# Patient Record
Sex: MALE | Race: WHITE | NOT HISPANIC OR LATINO | Employment: FULL TIME | URBAN - METROPOLITAN AREA
[De-identification: names, ages, dates, MRNs, and addresses within clinical notes are randomized per-mention and may not be internally consistent; named-entity substitution may affect disease eponyms.]

---

## 2017-04-03 ENCOUNTER — APPOINTMENT (OUTPATIENT)
Dept: PHYSICAL THERAPY | Facility: CLINIC | Age: 52
End: 2017-04-03
Payer: COMMERCIAL

## 2017-04-03 PROCEDURE — 97162 PT EVAL MOD COMPLEX 30 MIN: CPT

## 2017-04-03 PROCEDURE — 97535 SELF CARE MNGMENT TRAINING: CPT

## 2017-04-05 ENCOUNTER — APPOINTMENT (OUTPATIENT)
Dept: PHYSICAL THERAPY | Facility: CLINIC | Age: 52
End: 2017-04-05
Payer: COMMERCIAL

## 2017-04-05 PROCEDURE — 97140 MANUAL THERAPY 1/> REGIONS: CPT

## 2017-04-05 PROCEDURE — 97110 THERAPEUTIC EXERCISES: CPT

## 2017-04-10 ENCOUNTER — APPOINTMENT (OUTPATIENT)
Dept: PHYSICAL THERAPY | Facility: CLINIC | Age: 52
End: 2017-04-10
Payer: COMMERCIAL

## 2017-04-10 PROCEDURE — 97110 THERAPEUTIC EXERCISES: CPT

## 2017-04-10 PROCEDURE — 97140 MANUAL THERAPY 1/> REGIONS: CPT

## 2017-04-12 ENCOUNTER — APPOINTMENT (OUTPATIENT)
Dept: PHYSICAL THERAPY | Facility: CLINIC | Age: 52
End: 2017-04-12
Payer: COMMERCIAL

## 2017-04-12 PROCEDURE — 97140 MANUAL THERAPY 1/> REGIONS: CPT

## 2017-04-12 PROCEDURE — 97110 THERAPEUTIC EXERCISES: CPT

## 2017-04-19 ENCOUNTER — APPOINTMENT (OUTPATIENT)
Dept: PHYSICAL THERAPY | Facility: CLINIC | Age: 52
End: 2017-04-19
Payer: COMMERCIAL

## 2017-04-19 PROCEDURE — 97110 THERAPEUTIC EXERCISES: CPT

## 2017-04-19 PROCEDURE — 97140 MANUAL THERAPY 1/> REGIONS: CPT

## 2017-04-24 ENCOUNTER — APPOINTMENT (OUTPATIENT)
Dept: PHYSICAL THERAPY | Facility: CLINIC | Age: 52
End: 2017-04-24
Payer: COMMERCIAL

## 2017-04-24 PROCEDURE — 97110 THERAPEUTIC EXERCISES: CPT

## 2017-04-24 PROCEDURE — 97140 MANUAL THERAPY 1/> REGIONS: CPT

## 2017-04-26 ENCOUNTER — APPOINTMENT (OUTPATIENT)
Dept: PHYSICAL THERAPY | Facility: CLINIC | Age: 52
End: 2017-04-26
Payer: COMMERCIAL

## 2017-04-26 PROCEDURE — 97140 MANUAL THERAPY 1/> REGIONS: CPT

## 2017-04-26 PROCEDURE — 97110 THERAPEUTIC EXERCISES: CPT

## 2017-04-27 ENCOUNTER — APPOINTMENT (OUTPATIENT)
Dept: PHYSICAL THERAPY | Facility: CLINIC | Age: 52
End: 2017-04-27
Payer: COMMERCIAL

## 2017-05-04 ENCOUNTER — APPOINTMENT (OUTPATIENT)
Dept: PHYSICAL THERAPY | Facility: CLINIC | Age: 52
End: 2017-05-04
Payer: COMMERCIAL

## 2017-05-18 ENCOUNTER — APPOINTMENT (OUTPATIENT)
Dept: PHYSICAL THERAPY | Facility: CLINIC | Age: 52
End: 2017-05-18
Payer: COMMERCIAL

## 2017-05-18 PROCEDURE — 97164 PT RE-EVAL EST PLAN CARE: CPT

## 2017-05-18 PROCEDURE — 97140 MANUAL THERAPY 1/> REGIONS: CPT

## 2017-05-18 PROCEDURE — 97110 THERAPEUTIC EXERCISES: CPT

## 2017-05-23 ENCOUNTER — APPOINTMENT (OUTPATIENT)
Dept: PHYSICAL THERAPY | Facility: CLINIC | Age: 52
End: 2017-05-23
Payer: COMMERCIAL

## 2017-05-23 PROCEDURE — 97140 MANUAL THERAPY 1/> REGIONS: CPT

## 2017-05-23 PROCEDURE — 97110 THERAPEUTIC EXERCISES: CPT

## 2017-05-24 ENCOUNTER — APPOINTMENT (OUTPATIENT)
Dept: PHYSICAL THERAPY | Facility: CLINIC | Age: 52
End: 2017-05-24
Payer: COMMERCIAL

## 2017-05-24 PROCEDURE — 97110 THERAPEUTIC EXERCISES: CPT

## 2017-05-24 PROCEDURE — 97140 MANUAL THERAPY 1/> REGIONS: CPT

## 2017-05-30 ENCOUNTER — APPOINTMENT (OUTPATIENT)
Dept: PHYSICAL THERAPY | Facility: CLINIC | Age: 52
End: 2017-05-30
Payer: COMMERCIAL

## 2017-05-30 PROCEDURE — 97110 THERAPEUTIC EXERCISES: CPT

## 2017-05-30 PROCEDURE — 97140 MANUAL THERAPY 1/> REGIONS: CPT

## 2017-06-02 ENCOUNTER — APPOINTMENT (OUTPATIENT)
Dept: PHYSICAL THERAPY | Facility: CLINIC | Age: 52
End: 2017-06-02
Payer: COMMERCIAL

## 2017-06-02 PROCEDURE — 97140 MANUAL THERAPY 1/> REGIONS: CPT

## 2017-06-02 PROCEDURE — 97110 THERAPEUTIC EXERCISES: CPT

## 2017-06-07 ENCOUNTER — APPOINTMENT (OUTPATIENT)
Dept: PHYSICAL THERAPY | Facility: CLINIC | Age: 52
End: 2017-06-07
Payer: COMMERCIAL

## 2017-06-07 PROCEDURE — 97110 THERAPEUTIC EXERCISES: CPT

## 2017-06-07 PROCEDURE — 97140 MANUAL THERAPY 1/> REGIONS: CPT

## 2017-06-09 ENCOUNTER — APPOINTMENT (OUTPATIENT)
Dept: PHYSICAL THERAPY | Facility: CLINIC | Age: 52
End: 2017-06-09
Payer: COMMERCIAL

## 2017-06-09 PROCEDURE — 97140 MANUAL THERAPY 1/> REGIONS: CPT

## 2017-06-09 PROCEDURE — 97110 THERAPEUTIC EXERCISES: CPT

## 2017-06-12 ENCOUNTER — APPOINTMENT (OUTPATIENT)
Dept: PHYSICAL THERAPY | Facility: CLINIC | Age: 52
End: 2017-06-12
Payer: COMMERCIAL

## 2017-06-12 PROCEDURE — 97140 MANUAL THERAPY 1/> REGIONS: CPT

## 2017-06-12 PROCEDURE — 97110 THERAPEUTIC EXERCISES: CPT

## 2017-06-15 ENCOUNTER — APPOINTMENT (OUTPATIENT)
Dept: PHYSICAL THERAPY | Facility: CLINIC | Age: 52
End: 2017-06-15
Payer: COMMERCIAL

## 2017-06-15 PROCEDURE — 97110 THERAPEUTIC EXERCISES: CPT

## 2017-06-15 PROCEDURE — 97140 MANUAL THERAPY 1/> REGIONS: CPT

## 2017-07-20 ENCOUNTER — APPOINTMENT (OUTPATIENT)
Dept: LAB | Facility: HOSPITAL | Age: 52
End: 2017-07-20
Payer: COMMERCIAL

## 2017-07-20 ENCOUNTER — TRANSCRIBE ORDERS (OUTPATIENT)
Dept: ADMINISTRATIVE | Facility: HOSPITAL | Age: 52
End: 2017-07-20

## 2017-07-20 DIAGNOSIS — Z00.00 ENCOUNTER FOR GENERAL HEALTH EXAMINATION: Primary | ICD-10-CM

## 2017-07-20 DIAGNOSIS — Z00.00 ENCOUNTER FOR GENERAL HEALTH EXAMINATION: ICD-10-CM

## 2017-07-20 LAB
CHOLEST SERPL-MCNC: 189 MG/DL (ref 50–200)
EST. AVERAGE GLUCOSE BLD GHB EST-MCNC: 111 MG/DL
HBA1C MFR BLD: 5.5 % (ref 4.2–6.3)
HDLC SERPL-MCNC: 37 MG/DL (ref 40–60)
LDLC SERPL CALC-MCNC: 115 MG/DL (ref 0–100)
TRIGL SERPL-MCNC: 187 MG/DL

## 2017-07-20 PROCEDURE — 83036 HEMOGLOBIN GLYCOSYLATED A1C: CPT

## 2017-07-20 PROCEDURE — 80061 LIPID PANEL: CPT

## 2017-07-20 PROCEDURE — 36415 COLL VENOUS BLD VENIPUNCTURE: CPT

## 2017-07-25 ENCOUNTER — APPOINTMENT (OUTPATIENT)
Dept: RADIOLOGY | Facility: CLINIC | Age: 52
End: 2017-07-25
Payer: COMMERCIAL

## 2017-07-25 ENCOUNTER — ALLSCRIPTS OFFICE VISIT (OUTPATIENT)
Dept: OTHER | Facility: OTHER | Age: 52
End: 2017-07-25

## 2017-07-25 DIAGNOSIS — M25.569 PAIN IN KNEE: ICD-10-CM

## 2017-07-25 PROCEDURE — 73562 X-RAY EXAM OF KNEE 3: CPT

## 2017-07-25 PROCEDURE — 73564 X-RAY EXAM KNEE 4 OR MORE: CPT

## 2018-01-14 VITALS
DIASTOLIC BLOOD PRESSURE: 87 MMHG | HEIGHT: 70 IN | SYSTOLIC BLOOD PRESSURE: 130 MMHG | HEART RATE: 67 BPM | WEIGHT: 250 LBS | BODY MASS INDEX: 35.79 KG/M2

## 2018-07-31 ENCOUNTER — TRANSCRIBE ORDERS (OUTPATIENT)
Dept: LAB | Facility: CLINIC | Age: 53
End: 2018-07-31

## 2018-07-31 ENCOUNTER — APPOINTMENT (OUTPATIENT)
Dept: LAB | Facility: CLINIC | Age: 53
End: 2018-07-31

## 2018-07-31 DIAGNOSIS — Z00.8 HEALTH EXAMINATION IN POPULATION SURVEY: ICD-10-CM

## 2018-07-31 DIAGNOSIS — Z00.8 HEALTH EXAMINATION IN POPULATION SURVEY: Primary | ICD-10-CM

## 2018-07-31 LAB
CHOLEST SERPL-MCNC: 195 MG/DL (ref 50–200)
EST. AVERAGE GLUCOSE BLD GHB EST-MCNC: 111 MG/DL
HBA1C MFR BLD: 5.5 % (ref 4.2–6.3)
HDLC SERPL-MCNC: 33 MG/DL (ref 40–60)
LDLC SERPL CALC-MCNC: 101 MG/DL (ref 0–100)
NONHDLC SERPL-MCNC: 162 MG/DL
TRIGL SERPL-MCNC: 304 MG/DL

## 2018-07-31 PROCEDURE — 36415 COLL VENOUS BLD VENIPUNCTURE: CPT

## 2018-07-31 PROCEDURE — 80061 LIPID PANEL: CPT

## 2018-07-31 PROCEDURE — 83036 HEMOGLOBIN GLYCOSYLATED A1C: CPT

## 2018-09-10 ENCOUNTER — TELEPHONE (OUTPATIENT)
Dept: SLEEP CENTER | Facility: CLINIC | Age: 53
End: 2018-09-10

## 2018-09-10 NOTE — TELEPHONE ENCOUNTER
Called pt and left message to r/c to schedule Sleep Consult  Referral received from Dr Jennifer Hutton

## 2019-08-29 ENCOUNTER — OFFICE VISIT (OUTPATIENT)
Dept: URGENT CARE | Facility: CLINIC | Age: 54
End: 2019-08-29
Payer: COMMERCIAL

## 2019-08-29 VITALS
DIASTOLIC BLOOD PRESSURE: 83 MMHG | OXYGEN SATURATION: 100 % | TEMPERATURE: 98.1 F | RESPIRATION RATE: 18 BRPM | SYSTOLIC BLOOD PRESSURE: 154 MMHG | HEART RATE: 60 BPM

## 2019-08-29 DIAGNOSIS — S05.02XA ABRASION OF LEFT CORNEA, INITIAL ENCOUNTER: Primary | ICD-10-CM

## 2019-08-29 DIAGNOSIS — H57.89 REDNESS OF EYE, RIGHT: ICD-10-CM

## 2019-08-29 PROCEDURE — 99203 OFFICE O/P NEW LOW 30 MIN: CPT | Performed by: NURSE PRACTITIONER

## 2019-08-29 RX ORDER — TETRACAINE HYDROCHLORIDE 5 MG/ML
1 SOLUTION OPHTHALMIC ONCE
Status: COMPLETED | OUTPATIENT
Start: 2019-08-29 | End: 2019-08-29

## 2019-08-29 RX ORDER — METOPROLOL SUCCINATE 50 MG/1
50 TABLET, EXTENDED RELEASE ORAL DAILY
COMMUNITY
Start: 2019-03-05

## 2019-08-29 RX ORDER — ERYTHROMYCIN 5 MG/G
0.5 OINTMENT OPHTHALMIC EVERY 6 HOURS SCHEDULED
Qty: 3.5 G | Refills: 0 | Status: SHIPPED | OUTPATIENT
Start: 2019-08-29 | End: 2019-09-03

## 2019-08-29 RX ADMIN — TETRACAINE HYDROCHLORIDE 1 DROP: 5 SOLUTION OPHTHALMIC at 09:26

## 2019-08-29 NOTE — PROGRESS NOTES
[facility]        NAME: Kojo Martinze is a 47 y o  male  : 1965    MRN: [id number]  DATE: 2019  TIME: 10:25 AM    Assessment and Plan   Abrasion of left cornea, initial encounter [S05  02XA]  1  Abrasion of left cornea, initial encounter  erythromycin (ILOTYCIN) ophthalmic ointment   2  Redness of eye, right  tetracaine 0 5 % ophthalmic solution 1 drop    fluorescein sodium sterile 1 mg ophthalmic strip 1 strip         Patient Instructions     Use ointment as directed  Recommend follow-up with eye doctor  If he develops any increased pain, redness, drainage, swelling, blurred vision or any visual changes return or proceed ER  Follow up with PCP in 3-5 days  Proceed to  ER if symptoms worsen  Chief Complaint     Chief Complaint   Patient presents with    Eye Pain     Pt c/o left eye pain since yesterday  History of Present Illness       Patient is a 55-year-old male presents with a 2 day history of left eye pain, redness, drainage, foreign body sensation  Patient states that he flushed out his eye and  used over-the-counter eye drops with no relief  Denies any blurred vision, visual changes, headache, or dizziness  Complaining of photophobia  Denies any contact lens use  Review of Systems   Review of Systems   Constitutional: Negative for chills, diaphoresis, fatigue and fever  HENT: Negative  Eyes: Positive for photophobia, pain, discharge and redness  Negative for itching and visual disturbance  Respiratory: Negative for cough, chest tightness and shortness of breath  Cardiovascular: Negative for chest pain and palpitations  Musculoskeletal: Negative for arthralgias, back pain, joint swelling, myalgias, neck pain and neck stiffness  Skin: Negative for rash  Neurological: Negative for dizziness, facial asymmetry, weakness, light-headedness, numbness and headaches           Current Medications       Current Outpatient Medications:    metoprolol succinate (TOPROL-XL) 50 mg 24 hr tablet, Take 50 mg by mouth daily, Disp: , Rfl:     erythromycin (ILOTYCIN) ophthalmic ointment, Administer 0 5 inches into the left eye every 6 (six) hours for 5 days, Disp: 3 5 g, Rfl: 0  No current facility-administered medications for this visit  Current Allergies     Allergies as of 08/29/2019    (No Known Allergies)            The following portions of the patient's history were reviewed and updated as appropriate: allergies, current medications, past family history, past medical history, past social history, past surgical history and problem list      No past medical history on file  No past surgical history on file  No family history on file  Medications have been verified  Objective   /83   Pulse 60   Temp 98 1 °F (36 7 °C)   Resp 18   SpO2 100%        Physical Exam     Physical Exam   Constitutional: He is oriented to person, place, and time  He appears well-developed and well-nourished  No distress  HENT:   Head: Normocephalic and atraumatic  Eyes: Pupils are equal, round, and reactive to light  EOM and lids are normal  Lids are everted and swept, no foreign bodies found  Left eye exhibits no chemosis, no discharge, no exudate and no hordeolum  No foreign body present in the left eye  Left conjunctiva is injected  Left conjunctiva has no hemorrhage  No scleral icterus  Slit lamp exam:       The left eye shows corneal abrasion and fluorescein uptake  Cardiovascular: Normal rate, regular rhythm, S1 normal, S2 normal, normal heart sounds, intact distal pulses and normal pulses  Pulmonary/Chest: Effort normal and breath sounds normal    Neurological: He is alert and oriented to person, place, and time  GCS eye subscore is 4  GCS verbal subscore is 5  GCS motor subscore is 6  Skin: Skin is warm and dry  Capillary refill takes less than 2 seconds  He is not diaphoretic

## 2019-08-29 NOTE — PATIENT INSTRUCTIONS
Use ointment as directed  Recommend follow-up with eye doctor  If he develops any increased pain, redness, drainage, swelling, blurred vision or any visual changes return or proceed ER  Corneal Abrasion   WHAT YOU NEED TO KNOW:   A corneal abrasion is a scratch on the cornea of your eye  The cornea is the clear layer that covers the front of your eye  A small scratch may heal in 1 to 2 days  Deeper or larger scratches may take longer to heal         DISCHARGE INSTRUCTIONS:   Contact your healthcare provider if:   · Your eye pain or vision gets worse  · You have yellow or green drainage from your eye  · You have questions or concerns about your condition or care  Medicines:   · Medicines  may be given in the form of eyedrops or ointment to help prevent an eye infection  You may also be given eye drops to decrease pain  Ask how to take this medicine safely  · Take your medicine as directed  Contact your healthcare provider if you think your medicine is not helping or if you have side effects  Tell him or her if you are allergic to any medicine  Keep a list of the medicines, vitamins, and herbs you take  Include the amounts, and when and why you take them  Bring the list or the pill bottles to follow-up visits  Carry your medicine list with you in case of an emergency  Follow up with your healthcare provider as directed:  Write down your questions so you remember to ask them during your visits  Self-care:   · Do not touch or rub your eye  · Ask your healthcare provider when you can start your normal activities  · Ask your healthcare provider when you can wear your contact lenses  · Wear sunglasses in bright light until your eyes feel better  Help prevent corneal abrasions:   · Remove your contact lenses if your eyes feel dry or irritated  · Wash your hands if you need to touch your eyes or your face  · Trim your child's fingernails so he cannot scratch his eye       · Wear protective eyewear when you work with chemicals, wood, dust, or metal      · Wear protective eyewear when you play sports  · Do not wear your contacts for longer than you should  · Do not wear colored lenses or lenses with shapes on them  These lenses may cause eye damage and vision loss  · Do not wear glitter makeup  Glitter can easily get into your eyes and under contact lenses  · Do not sleep with your contacts in your eyes  © 2017 [facility] Information is for End User's use only and may not be sold, redistributed or otherwise used for commercial purposes  All illustrations and images included in CareNotes® are the copyrighted property of [employer]  or [employer]  The above information is an  only  It is not intended as medical advice for individual conditions or treatments  Talk to your doctor, nurse or pharmacist before following any medical regimen to see if it is safe and effective for you

## 2019-08-30 ENCOUNTER — RX ONLY (RX ONLY)
Age: 54
End: 2019-08-30

## 2019-08-30 ENCOUNTER — DOCTOR'S OFFICE (OUTPATIENT)
Dept: URBAN - METROPOLITAN AREA CLINIC 136 | Facility: CLINIC | Age: 54
Setting detail: OPHTHALMOLOGY
End: 2019-08-30
Payer: COMMERCIAL

## 2019-08-30 DIAGNOSIS — H10.13: ICD-10-CM

## 2019-08-30 DIAGNOSIS — H01.001: ICD-10-CM

## 2019-08-30 DIAGNOSIS — S05.02XA: ICD-10-CM

## 2019-08-30 DIAGNOSIS — H25.13: ICD-10-CM

## 2019-08-30 PROCEDURE — 99203 OFFICE O/P NEW LOW 30 MIN: CPT | Performed by: OPHTHALMOLOGY

## 2019-08-30 ASSESSMENT — REFRACTION_CURRENTRX
OS_OVR_VA: 20/
OD_OVR_VA: 20/
OS_OVR_VA: 20/
OS_OVR_VA: 20/
OD_OVR_VA: 20/
OD_OVR_VA: 20/

## 2019-08-30 ASSESSMENT — REFRACTION_MANIFEST
OU_VA: 20/
OS_VA1: 20/
OD_VA2: 20/
OU_VA: 20/
OS_VA3: 20/
OD_VA3: 20/
OD_VA1: 20/
OS_VA1: 20/
OD_VA1: 20/
OS_VA2: 20/
OD_VA2: 20/
OD_VA3: 20/
OS_VA3: 20/
OS_VA2: 20/

## 2019-08-30 ASSESSMENT — LID POSITION - COMMENTS
OD_COMMENTS: FLOPPY LIDS
OS_COMMENTS: FLOPPY LIDS

## 2019-08-30 ASSESSMENT — LID EXAM ASSESSMENTS
OD_BLEPHARITIS: 1+
OS_EDEMA: LUL T 1+
OS_BLEPHARITIS: 1+

## 2019-08-30 ASSESSMENT — CORNEAL TRAUMA - ABRASION: OS_ABRASION: PRESENT

## 2019-08-30 ASSESSMENT — VISUAL ACUITY
OD_BCVA: 20/25+2
OS_BCVA: 20/20-2

## 2019-09-03 ENCOUNTER — RX ONLY (RX ONLY)
Age: 54
End: 2019-09-03

## 2019-09-03 ENCOUNTER — DOCTOR'S OFFICE (OUTPATIENT)
Dept: URBAN - METROPOLITAN AREA CLINIC 136 | Facility: CLINIC | Age: 54
Setting detail: OPHTHALMOLOGY
End: 2019-09-03
Payer: COMMERCIAL

## 2019-09-03 DIAGNOSIS — H02.89: ICD-10-CM

## 2019-09-03 DIAGNOSIS — S05.02XA: ICD-10-CM

## 2019-09-03 DIAGNOSIS — G51.0: ICD-10-CM

## 2019-09-03 PROBLEM — H10.13 ALLERGIC CONJUNCTIVITIS; BOTH EYES: Status: ACTIVE | Noted: 2019-08-30

## 2019-09-03 PROBLEM — H01.001 BLEPHARITIS; RIGHT UPPER LID, LEFT UPPER LID: Status: ACTIVE | Noted: 2019-08-30

## 2019-09-03 PROBLEM — H01.004 BLEPHARITIS; RIGHT UPPER LID, LEFT UPPER LID: Status: ACTIVE | Noted: 2019-08-30

## 2019-09-03 PROBLEM — H25.11 CATARACT NUCLEAR SCLEROSIS AGE RELATED; RIGHT EYE, LEFT EYE: Status: ACTIVE | Noted: 2019-08-30

## 2019-09-03 PROBLEM — H25.12 CATARACT NUCLEAR SCLEROSIS AGE RELATED; RIGHT EYE, LEFT EYE: Status: ACTIVE | Noted: 2019-08-30

## 2019-09-03 PROCEDURE — 92012 INTRM OPH EXAM EST PATIENT: CPT | Performed by: OPHTHALMOLOGY

## 2019-09-03 ASSESSMENT — LID EXAM ASSESSMENTS
OS_BLEPHARITIS: 1+
OD_BLEPHARITIS: 1+
OS_EDEMA: LUL T 1+

## 2019-09-03 ASSESSMENT — REFRACTION_MANIFEST
OD_VA3: 20/
OS_VA2: 20/
OU_VA: 20/
OS_VA1: 20/
OU_VA: 20/
OD_VA1: 20/
OS_VA3: 20/
OS_VA3: 20/
OD_VA2: 20/
OD_VA3: 20/
OS_VA1: 20/
OD_VA2: 20/
OD_VA1: 20/
OS_VA2: 20/

## 2019-09-03 ASSESSMENT — REFRACTION_CURRENTRX
OS_OVR_VA: 20/
OS_OVR_VA: 20/
OD_OVR_VA: 20/
OD_OVR_VA: 20/
OS_OVR_VA: 20/
OD_OVR_VA: 20/

## 2019-09-03 ASSESSMENT — KERATOMETRY
OS_K1POWER_DIOPTERS: 43.00
OD_AXISANGLE_DEGREES: 144
OS_K2POWER_DIOPTERS: 43.75
OD_K1POWER_DIOPTERS: 43.00
OD_K2POWER_DIOPTERS: 43.00
OS_AXISANGLE_DEGREES: 160

## 2019-09-03 ASSESSMENT — REFRACTION_AUTOREFRACTION
OS_CYLINDER: -1.00
OD_CYLINDER: -0.25
OD_AXIS: 144
OS_SPHERE: +2.75
OS_AXIS: 055
OD_SPHERE: +1.75

## 2019-09-03 ASSESSMENT — AXIALLENGTH_DERIVED
OS_AL: 22.7895
OD_AL: 23.1509

## 2019-09-03 ASSESSMENT — VISUAL ACUITY
OS_BCVA: 20/20
OD_BCVA: 20/20

## 2019-09-03 ASSESSMENT — LID POSITION - COMMENTS
OS_COMMENTS: FLOPPY LIDS
OD_COMMENTS: FLOPPY LIDS

## 2019-09-03 ASSESSMENT — SPHEQUIV_DERIVED
OS_SPHEQUIV: 2.25
OD_SPHEQUIV: 1.625

## 2019-09-03 ASSESSMENT — CORNEAL TRAUMA - ABRASION: OS_ABRASION: PRESENT

## 2019-09-03 ASSESSMENT — CONFRONTATIONAL VISUAL FIELD TEST (CVF)
OS_FINDINGS: FULL
OD_FINDINGS: FULL

## 2019-10-15 ENCOUNTER — APPOINTMENT (OUTPATIENT)
Dept: RADIOLOGY | Facility: MEDICAL CENTER | Age: 54
End: 2019-10-15
Payer: COMMERCIAL

## 2019-10-15 VITALS
SYSTOLIC BLOOD PRESSURE: 148 MMHG | BODY MASS INDEX: 47.32 KG/M2 | HEART RATE: 63 BPM | DIASTOLIC BLOOD PRESSURE: 89 MMHG | WEIGHT: 284 LBS | HEIGHT: 65 IN

## 2019-10-15 DIAGNOSIS — M17.12 PRIMARY OSTEOARTHRITIS OF LEFT KNEE: Primary | ICD-10-CM

## 2019-10-15 DIAGNOSIS — M17.11 PRIMARY OSTEOARTHRITIS OF RIGHT KNEE: ICD-10-CM

## 2019-10-15 DIAGNOSIS — M25.562 LEFT KNEE PAIN, UNSPECIFIED CHRONICITY: ICD-10-CM

## 2019-10-15 DIAGNOSIS — M25.561 RIGHT KNEE PAIN, UNSPECIFIED CHRONICITY: ICD-10-CM

## 2019-10-15 PROCEDURE — 20610 DRAIN/INJ JOINT/BURSA W/O US: CPT | Performed by: ORTHOPAEDIC SURGERY

## 2019-10-15 PROCEDURE — 73564 X-RAY EXAM KNEE 4 OR MORE: CPT

## 2019-10-15 PROCEDURE — 99213 OFFICE O/P EST LOW 20 MIN: CPT | Performed by: ORTHOPAEDIC SURGERY

## 2019-10-15 RX ORDER — METHYLPREDNISOLONE ACETATE 80 MG/ML
1 INJECTION, SUSPENSION INTRA-ARTICULAR; INTRALESIONAL; INTRAMUSCULAR; SOFT TISSUE
Status: COMPLETED | OUTPATIENT
Start: 2019-10-15 | End: 2019-10-15

## 2019-10-15 RX ORDER — BUPIVACAINE HYDROCHLORIDE 2.5 MG/ML
4 INJECTION, SOLUTION INFILTRATION; PERINEURAL
Status: COMPLETED | OUTPATIENT
Start: 2019-10-15 | End: 2019-10-15

## 2019-10-15 RX ORDER — QUINAPRIL HCL AND HYDROCHLOROTHIAZIDE 20; 12.5 MG/1; MG/1
1 TABLET ORAL DAILY
COMMUNITY
Start: 2019-09-12

## 2019-10-15 RX ORDER — OFLOXACIN 3 MG/ML
SOLUTION/ DROPS OPHTHALMIC
Refills: 0 | COMMUNITY
Start: 2019-08-30

## 2019-10-15 RX ADMIN — BUPIVACAINE HYDROCHLORIDE 4 ML: 2.5 INJECTION, SOLUTION INFILTRATION; PERINEURAL at 09:04

## 2019-10-15 RX ADMIN — METHYLPREDNISOLONE ACETATE 1 ML: 80 INJECTION, SUSPENSION INTRA-ARTICULAR; INTRALESIONAL; INTRAMUSCULAR; SOFT TISSUE at 09:04

## 2019-10-15 NOTE — PROGRESS NOTES
Orthopaedic Surgery - Office Note  Katherin Dockery (63 y o  male)   : 1965   MRN: [id number]  Encounter Date: 10/15/2019    Chief Complaint   Patient presents with    Left Knee - Follow-up    Right Knee - Follow-up       Assessment / Plan  Bilateral knee osteoarthritis     · CSI of bilateral knee joint was performed   · Continuing with a HEP for hip and thigh strengthening was advised  Return in about 3 months (around 1/15/2020) for Recheck  History of Present Illness  Katherin Dockery is a 47 y o  male who presents with bilateral knee pain that has been ongoing for 3+ years  He has been taking mobic in the past for his knee pain however about a year ago his primary care physician instructed him to discontinue taking this as he did not want him to be on this medication long term  Patient states he also tried taking tumeric however this caused the patient to get gout  The patient describes his pain today as moderate intermittent sharp pain to his bilateral knees left worse than right  Patient states that this bothers him with prolonged walking especially on uneven surfaces  Patient states that over the weekend he did help his son move and heavy item and that also aggravated his knee pain  He denies numbness and tingling  Patient's wife is a physical therapist and states that he has been doing a home exercise program for the last couple years for hip and thigh strengthening without relief of his symptoms  He has never tried steroid injections  Review of Systems  Pertinent items are noted in HPI  All other systems were reviewed and are negative  Physical Exam  /89   Pulse 63   Ht 5' 5" (1 651 m)   Wt 129 kg (284 lb)   BMI 47 26 kg/m²   Cons: Appears well  No apparent distress  Psych: Alert  Oriented x3  Mood and affect normal   Eyes: PERRLA, EOMI  Resp: Normal effort  No audible wheezing or stridor  CV: Palpable pulse  No discernable arrhythmia  No LE edema    Lymph:  No palpable cervical, axillary, or inguinal lymphadenopathy  Skin: Warm  No palpable masses  No visible lesions  Neuro: Normal muscle tone  Normal and symmetric DTR's  Bilateral Knee Exam  Alignment:  bilateral genu varus  Inspection:  No swelling  No erythema  Palpation:  medial joint line tenderness  ROM:  Normal knee ROM  Strength:  Able to actively extend knee against gravity  Stability:  No objective knee instability  Stable Varus / Valgus stress, Lachman, and Posterior drawer  Tests:  No pertinent positive or negative tests  Patella:  Patella tracks centrally with crepitus  Neurovascular:  Sensation intact in DP/SP/Wong/Sa/T nerve distributions  2+ DP & PT pulses  Gait:  Normal     Studies Reviewed  I have personally reviewed pertinent films in PACS and my interpretation is xray of bilateral knee on 10/15/19 demonstrates degenerative changes such as joint space narrowing, osteophyte formation and sclerosis   Large joint arthrocentesis: bilateral knee  Date/Time: 10/15/2019 9:04 AM  Consent given by: patient  Site marked: site marked  Supporting Documentation  Indications: pain   Procedure Details  Location: knee - bilateral knee    Medications (Right): 4 mL bupivacaine 0 25 %; 1 mL methylPREDNISolone acetate 80 mg/mLMedications (Left): 4 mL bupivacaine 0 25 %; 1 mL methylPREDNISolone acetate 80 mg/mL   Patient tolerance: patient tolerated the procedure well with no immediate complications  Dressing:  Sterile dressing applied             Medical, Surgical, Family, and Social History  The patient's medical history, family history, and social history, were reviewed and updated as appropriate  History reviewed  No pertinent past medical history  History reviewed  No pertinent surgical history  History reviewed  No pertinent family history      Social History     Occupational History    Not on file   Tobacco Use    Smoking status: Never Smoker    Smokeless tobacco: Never Used   Substance and Sexual Activity    Alcohol use: Not on file    Drug use: Not on file    Sexual activity: Not on file       No Known Allergies      Current Outpatient Medications:     metoprolol succinate (TOPROL-XL) 50 mg 24 hr tablet, Take 50 mg by mouth daily, Disp: , Rfl:     ofloxacin (OCUFLOX) 0 3 % ophthalmic solution, instill 1 drop into both eyes four times a day, Disp: , Rfl: 0    quinapril-hydrochlorothiazide (ACCURETIC) 20-12 5 MG per tablet, Take 1 tablet by mouth daily, Disp: , Rfl:       Scribe Attestation    I,:   Anabel Green am acting as a scribe while in the presence of the attending physician :        I,:   Melida Craig MD personally performed the services described in this documentation    as scribed in my presence :

## 2019-10-16 ENCOUNTER — TELEPHONE (OUTPATIENT)
Dept: OBGYN CLINIC | Facility: MEDICAL CENTER | Age: 54
End: 2019-10-16

## 2019-10-16 NOTE — TELEPHONE ENCOUNTER
[facility] called and left vmail  Called back and was informed that this patients prescription of diclofenac sodium gel will not be approved by insurance  Offering alternatives such as diclofenac sodium topical sol'n, tablets, meloxicam tablets, or ibuprofen  Please advise patient and also call [facility] back      CB number: [phone]  Ref number: [id number]

## 2019-10-17 DIAGNOSIS — M17.11 PRIMARY OSTEOARTHRITIS OF RIGHT KNEE: Primary | ICD-10-CM

## 2019-10-17 RX ORDER — DICLOFENAC SODIUM 75 MG/1
75 TABLET, DELAYED RELEASE ORAL 2 TIMES DAILY
Qty: 60 TABLET | Refills: 2 | Status: SHIPPED | OUTPATIENT
Start: 2019-10-17 | End: 2020-11-09

## 2019-10-17 NOTE — TELEPHONE ENCOUNTER
Called [facility] and confirmed that Diclofenac Sodium tablets 75mg meets formulary  This is correct  Verbal Rx was placed via [facility] per patient's preferred pharmacy       CB#

## 2019-10-17 NOTE — TELEPHONE ENCOUNTER
Spoke with patient to see if he has any problem taking medication by mouth  He would like to get the diclofenac in pills

## 2020-03-13 ENCOUNTER — APPOINTMENT (OUTPATIENT)
Dept: LAB | Facility: CLINIC | Age: 55
End: 2020-03-13

## 2020-03-13 ENCOUNTER — TRANSCRIBE ORDERS (OUTPATIENT)
Dept: RADIOLOGY | Facility: CLINIC | Age: 55
End: 2020-03-13

## 2020-03-13 DIAGNOSIS — Z00.8 ENCOUNTER FOR OTHER GENERAL EXAMINATION: ICD-10-CM

## 2020-03-13 DIAGNOSIS — Z00.8 ENCOUNTER FOR OTHER GENERAL EXAMINATION: Primary | ICD-10-CM

## 2020-03-13 LAB
CHOLEST SERPL-MCNC: 198 MG/DL (ref 50–200)
EST. AVERAGE GLUCOSE BLD GHB EST-MCNC: 100 MG/DL
HBA1C MFR BLD: 5.1 %
HDLC SERPL-MCNC: 36 MG/DL
LDLC SERPL CALC-MCNC: 135 MG/DL (ref 0–100)
NONHDLC SERPL-MCNC: 162 MG/DL
TRIGL SERPL-MCNC: 136 MG/DL

## 2020-03-13 PROCEDURE — 83036 HEMOGLOBIN GLYCOSYLATED A1C: CPT

## 2020-03-13 PROCEDURE — 36415 COLL VENOUS BLD VENIPUNCTURE: CPT

## 2020-03-13 PROCEDURE — 80061 LIPID PANEL: CPT

## 2020-09-23 ENCOUNTER — OFFICE VISIT (OUTPATIENT)
Dept: OBGYN CLINIC | Facility: OTHER | Age: 55
End: 2020-09-23
Payer: COMMERCIAL

## 2020-09-23 ENCOUNTER — APPOINTMENT (OUTPATIENT)
Dept: RADIOLOGY | Facility: OTHER | Age: 55
End: 2020-09-23
Payer: COMMERCIAL

## 2020-09-23 VITALS
SYSTOLIC BLOOD PRESSURE: 136 MMHG | WEIGHT: 284 LBS | HEIGHT: 65 IN | HEART RATE: 55 BPM | TEMPERATURE: 96.5 F | BODY MASS INDEX: 47.32 KG/M2 | DIASTOLIC BLOOD PRESSURE: 82 MMHG

## 2020-09-23 DIAGNOSIS — M16.12 PRIMARY OSTEOARTHRITIS OF ONE HIP, LEFT: ICD-10-CM

## 2020-09-23 DIAGNOSIS — M25.552 PAIN IN LEFT HIP: ICD-10-CM

## 2020-09-23 DIAGNOSIS — M17.0 PRIMARY OSTEOARTHRITIS OF BOTH KNEES: ICD-10-CM

## 2020-09-23 DIAGNOSIS — M25.552 PAIN IN LEFT HIP: Primary | ICD-10-CM

## 2020-09-23 PROCEDURE — 99214 OFFICE O/P EST MOD 30 MIN: CPT | Performed by: PHYSICIAN ASSISTANT

## 2020-09-23 PROCEDURE — 73502 X-RAY EXAM HIP UNI 2-3 VIEWS: CPT

## 2020-09-23 PROCEDURE — 20610 DRAIN/INJ JOINT/BURSA W/O US: CPT | Performed by: PHYSICIAN ASSISTANT

## 2020-09-23 RX ORDER — BUPIVACAINE HYDROCHLORIDE 2.5 MG/ML
4 INJECTION, SOLUTION INFILTRATION; PERINEURAL
Status: COMPLETED | OUTPATIENT
Start: 2020-09-23 | End: 2020-09-23

## 2020-09-23 RX ORDER — METHYLPREDNISOLONE ACETATE 40 MG/ML
1 INJECTION, SUSPENSION INTRA-ARTICULAR; INTRALESIONAL; INTRAMUSCULAR; SOFT TISSUE
Status: COMPLETED | OUTPATIENT
Start: 2020-09-23 | End: 2020-09-23

## 2020-09-23 RX ADMIN — METHYLPREDNISOLONE ACETATE 1 ML: 40 INJECTION, SUSPENSION INTRA-ARTICULAR; INTRALESIONAL; INTRAMUSCULAR; SOFT TISSUE at 11:47

## 2020-09-23 RX ADMIN — BUPIVACAINE HYDROCHLORIDE 4 ML: 2.5 INJECTION, SOLUTION INFILTRATION; PERINEURAL at 11:45

## 2020-09-23 RX ADMIN — BUPIVACAINE HYDROCHLORIDE 4 ML: 2.5 INJECTION, SOLUTION INFILTRATION; PERINEURAL at 11:47

## 2020-09-23 RX ADMIN — METHYLPREDNISOLONE ACETATE 1 ML: 40 INJECTION, SUSPENSION INTRA-ARTICULAR; INTRALESIONAL; INTRAMUSCULAR; SOFT TISSUE at 11:45

## 2020-09-23 NOTE — PROGRESS NOTES
Orthopaedic Surgery - Office Note  Fatimah Shaw (8 y o  male)   : 1965   MRN: [id number]  Encounter Date: 2020    Chief Complaint   Patient presents with    Left Knee - Follow-up    Right Knee - Follow-up       Assessment / Plan  Bilateral knee osteoarthritis   Osteoarthritis left hip    · After discussion of risk and benefits, CSI of bilateral knee joint was performed and tolerated well  · We did discuss other treatment options for arthritis of the knees including  bracing and viscosupplementation as options for the future  · We did briefly discuss surgical treatment of the knees with knee replacement  · Patient will continue with ice and analgesics as needed  · Patient will continue to pursue weight loss and does have understanding that for knee replacement his BMI should be under 40  · We did provide him with handouts for HEP for hip and thigh strengthening  · For the patient's left hip he will continue with conservative treatment at this time including the home exercise program   He will follow up as needed for the hip going forward if the pain worsens  Return if symptoms worsen or fail to improve  History of Present Illness  Fatimah Shaw is a 54 y o  male follow-up for bilateral knee pain secondary to severe medial joint line arthritis  Last visit in 2019 he did receive bilateral steroid injections which helped him for approximately 3 months  Since he has been continuing with conservative treatment including a home exercise program for hip and thigh strengthening  Pursuit of weight loss  Voltaren gel which she felt did help and occasionally taking mobic though his primary care physician instructed him to limit the amount of NSAIDs taking due to risk of kidney and stomach issues  Patient states he also tried taking tumeric however this caused the patient to get gout    Had this time is mostly in the medial joint line of the knee and he denies episodes of excessive swelling that he notices  Patient states that this bothers him with prolonged walking especially on uneven surfaces  Patient states that over the weekend he did help his son move and heavy item and that also aggravated his knee pain  He denies numbness and tingling  Patient's wife is a physical therapist and states that he has been doing a home exercise program for the last couple years for hip and thigh strengthening without relief of his symptoms  Patient has not had viscosupplementation or tried bracing in the past     Patient is also complaining of pain in the left groin that started last Thanksgiving when he twisted his leg  He states that the pain has been consistent since in the groin with some radiation laterally to the gluteal area  He denies any other injuries to the hip   He feels that this discomfort does come and go and is less debilitating than his knees  He has not had  any treatment for this other than taking anti-inflammatories which does help  He denies any radiation of the pain down his leg or distal extremity numbness or tingling  Review of Systems  Pertinent items are noted in HPI  All other systems were reviewed and are negative  Physical Exam  /82   Pulse 55   Temp (!) 96 5 °F (35 8 °C)   Ht 5' 5" (1 651 m)   Wt 129 kg (284 lb)   BMI 47 26 kg/m²   Cons: Appears well  No apparent distress  Psych: Alert  Oriented x3  Mood and affect normal   Eyes: PERRLA, EOMI  Resp: Normal effort  No audible wheezing or stridor  CV: Palpable pulse  No discernable arrhythmia  No LE edema  Lymph:  No palpable cervical, axillary, or inguinal lymphadenopathy  Skin: Warm  No palpable masses  No visible lesions  Neuro: Normal muscle tone  Normal and symmetric DTR's  Bilateral Knee Exam  Alignment:  bilateral genu varus  Inspection:  No swelling  No erythema  Palpation:  medial joint line tenderness  ROM:  Normal knee ROM    Strength:  Able to actively extend knee against gravity  Stability:  No objective knee instability  Stable Varus / Valgus stress, Lachman, and Posterior drawer  Tests:  No pertinent positive or negative tests  Patella:  Patella tracks centrally with crepitus  Neurovascular:  Sensation intact in DP/SP/Wong/Sa/T nerve distributions  2+ DP & PT pulses  Gait:  Antalgic  Left Hip Exam  Alignment / Posture:  Normal resting hip posture  Inspection:  No swelling  No erythema  No deformity  Palpation:  Mild tenderness at Lateral aspect of the hip over the greater trochanter  ROM:  Normal hip ROM  External rotation to 80°, internal rotation 20° but painful in the groin  Strength:  5/5 hip flexors and abductors  Stability:  No objective hip instability  Tests:  (+) FADDIR  Neurovascular:  Sensation intact in DP/SP/Wogn/Sa/T nerve distributions  Sensation intact in all digital nerve distributions  Toes warm and perfused  Gait:  Antalgic  Studies Reviewed  I have personally reviewed pertinent films in PACS  X-ray of bilateral knee on 10/15/19 demonstrates degenerative changes such as joint space narrowing, osteophyte formation and sclerosis   X-ray left hip-from 09/23/2020 shows mild degenerative changes of the left hip joint including spurring and mild joint narrowing  No fracture dislocations seen      Large joint arthrocentesis: R knee  Date/Time: 9/23/2020 11:45 AM  Consent given by: patient  Site marked: site marked  Timeout: Immediately prior to procedure a time out was called to verify the correct patient, procedure, equipment, support staff and site/side marked as required   Supporting Documentation  Indications: pain   Procedure Details  Location: knee - R knee  Needle size: 22 G  Ultrasound guidance: no  Approach: anterolateral  Medications administered: 4 mL bupivacaine 0 25 %; 1 mL methylPREDNISolone acetate 40 mg/mL    Patient tolerance: patient tolerated the procedure well with no immediate complications  Dressing:  Sterile dressing applied    Large joint arthrocentesis: L knee  Date/Time: 9/23/2020 11:47 AM  Consent given by: patient  Site marked: site marked  Timeout: Immediately prior to procedure a time out was called to verify the correct patient, procedure, equipment, support staff and site/side marked as required   Supporting Documentation  Indications: pain   Procedure Details  Location: knee - L knee  Needle size: 22 G  Ultrasound guidance: no  Approach: anterolateral  Medications administered: 4 mL bupivacaine 0 25 %; 1 mL methylPREDNISolone acetate 40 mg/mL    Patient tolerance: patient tolerated the procedure well with no immediate complications  Dressing:  Sterile dressing applied             Medical, Surgical, Family, and Social History  The patient's medical history, family history, and social history, were reviewed and updated as appropriate  History reviewed  No pertinent past medical history  History reviewed  No pertinent surgical history  History reviewed  No pertinent family history      Social History     Occupational History    Not on file   Tobacco Use    Smoking status: Never Smoker    Smokeless tobacco: Never Used   Substance and Sexual Activity    Alcohol use: Not on file    Drug use: Not on file    Sexual activity: Not on file       No Known Allergies      Current Outpatient Medications:     diclofenac (VOLTAREN) 75 mg EC tablet, Take 1 tablet (75 mg total) by mouth 2 (two) times a day, Disp: 60 tablet, Rfl: 2    diclofenac sodium (VOLTAREN) 1 %, Apply 2 g topically 4 (four) times a day, Disp: 1 Tube, Rfl: 3    metoprolol succinate (TOPROL-XL) 50 mg 24 hr tablet, Take 50 mg by mouth daily, Disp: , Rfl:     ofloxacin (OCUFLOX) 0 3 % ophthalmic solution, instill 1 drop into both eyes four times a day, Disp: , Rfl: 0    quinapril-hydrochlorothiazide (ACCURETIC) 20-12 5 MG per tablet, Take 1 tablet by mouth daily, Disp: , Rfl:       Scribe Attestation    I,: am acting as a scribe while in the presence of the attending physician :        I,:    personally performed the services described in this documentation    as scribed in my presence :

## 2020-11-09 DIAGNOSIS — M17.11 PRIMARY OSTEOARTHRITIS OF RIGHT KNEE: ICD-10-CM

## 2020-11-09 RX ORDER — DICLOFENAC SODIUM 75 MG/1
TABLET, DELAYED RELEASE ORAL
Qty: 60 TABLET | Refills: 11 | Status: SHIPPED | OUTPATIENT
Start: 2020-11-09 | End: 2022-01-03

## 2021-04-01 ENCOUNTER — TRANSCRIBE ORDERS (OUTPATIENT)
Dept: LAB | Facility: CLINIC | Age: 56
End: 2021-04-01

## 2021-04-01 ENCOUNTER — APPOINTMENT (OUTPATIENT)
Dept: LAB | Facility: CLINIC | Age: 56
End: 2021-04-01
Payer: COMMERCIAL

## 2021-04-01 DIAGNOSIS — E55.9 VITAMIN D DEFICIENCY: ICD-10-CM

## 2021-04-01 DIAGNOSIS — I10 ESSENTIAL HYPERTENSION: ICD-10-CM

## 2021-04-01 DIAGNOSIS — E78.2 MIXED HYPERLIPIDEMIA: ICD-10-CM

## 2021-04-01 DIAGNOSIS — Z12.5 SCREENING FOR PROSTATE CANCER: ICD-10-CM

## 2021-04-01 DIAGNOSIS — Z00.00 ENCOUNTER FOR ANNUAL PHYSICAL EXAM: Primary | ICD-10-CM

## 2021-04-01 DIAGNOSIS — Z00.00 ENCOUNTER FOR ANNUAL PHYSICAL EXAM: ICD-10-CM

## 2021-04-01 LAB
25(OH)D3 SERPL-MCNC: 44.7 NG/ML (ref 30–100)
ALBUMIN SERPL BCP-MCNC: 3.8 G/DL (ref 3.5–5)
ALP SERPL-CCNC: 72 U/L (ref 46–116)
ALT SERPL W P-5'-P-CCNC: 35 U/L (ref 12–78)
ANION GAP SERPL CALCULATED.3IONS-SCNC: 3 MMOL/L (ref 4–13)
AST SERPL W P-5'-P-CCNC: 13 U/L (ref 5–45)
BASOPHILS # BLD AUTO: 0.09 THOUSANDS/ΜL (ref 0–0.1)
BASOPHILS NFR BLD AUTO: 1 % (ref 0–1)
BILIRUB SERPL-MCNC: 0.57 MG/DL (ref 0.2–1)
BUN SERPL-MCNC: 26 MG/DL (ref 5–25)
CALCIUM SERPL-MCNC: 9.3 MG/DL (ref 8.3–10.1)
CHLORIDE SERPL-SCNC: 109 MMOL/L (ref 100–108)
CHOLEST SERPL-MCNC: 152 MG/DL (ref 50–200)
CO2 SERPL-SCNC: 27 MMOL/L (ref 21–32)
CREAT SERPL-MCNC: 1.09 MG/DL (ref 0.6–1.3)
EOSINOPHIL # BLD AUTO: 0.36 THOUSAND/ΜL (ref 0–0.61)
EOSINOPHIL NFR BLD AUTO: 5 % (ref 0–6)
ERYTHROCYTE [DISTWIDTH] IN BLOOD BY AUTOMATED COUNT: 11.8 % (ref 11.6–15.1)
GFR SERPL CREATININE-BSD FRML MDRD: 75 ML/MIN/1.73SQ M
GLUCOSE P FAST SERPL-MCNC: 108 MG/DL (ref 65–99)
HCT VFR BLD AUTO: 42.4 % (ref 36.5–49.3)
HDLC SERPL-MCNC: 37 MG/DL
HGB BLD-MCNC: 14.5 G/DL (ref 12–17)
IMM GRANULOCYTES # BLD AUTO: 0.02 THOUSAND/UL (ref 0–0.2)
IMM GRANULOCYTES NFR BLD AUTO: 0 % (ref 0–2)
LDLC SERPL CALC-MCNC: 78 MG/DL (ref 0–100)
LYMPHOCYTES # BLD AUTO: 2.47 THOUSANDS/ΜL (ref 0.6–4.47)
LYMPHOCYTES NFR BLD AUTO: 35 % (ref 14–44)
MCH RBC QN AUTO: 32.2 PG (ref 26.8–34.3)
MCHC RBC AUTO-ENTMCNC: 34.2 G/DL (ref 31.4–37.4)
MCV RBC AUTO: 94 FL (ref 82–98)
MONOCYTES # BLD AUTO: 0.75 THOUSAND/ΜL (ref 0.17–1.22)
MONOCYTES NFR BLD AUTO: 11 % (ref 4–12)
NEUTROPHILS # BLD AUTO: 3.38 THOUSANDS/ΜL (ref 1.85–7.62)
NEUTS SEG NFR BLD AUTO: 48 % (ref 43–75)
NONHDLC SERPL-MCNC: 115 MG/DL
NRBC BLD AUTO-RTO: 0 /100 WBCS
PLATELET # BLD AUTO: 240 THOUSANDS/UL (ref 149–390)
PMV BLD AUTO: 10.2 FL (ref 8.9–12.7)
POTASSIUM SERPL-SCNC: 4.5 MMOL/L (ref 3.5–5.3)
PROT SERPL-MCNC: 7.2 G/DL (ref 6.4–8.2)
PSA SERPL-MCNC: 0.3 NG/ML (ref 0–4)
RBC # BLD AUTO: 4.51 MILLION/UL (ref 3.88–5.62)
SODIUM SERPL-SCNC: 139 MMOL/L (ref 136–145)
TRIGL SERPL-MCNC: 186 MG/DL
TSH SERPL DL<=0.05 MIU/L-ACNC: 1.1 UIU/ML (ref 0.36–3.74)
WBC # BLD AUTO: 7.07 THOUSAND/UL (ref 4.31–10.16)

## 2021-04-01 PROCEDURE — G0103 PSA SCREENING: HCPCS

## 2021-04-01 PROCEDURE — 80061 LIPID PANEL: CPT

## 2021-04-01 PROCEDURE — 85025 COMPLETE CBC W/AUTO DIFF WBC: CPT

## 2021-04-01 PROCEDURE — 84443 ASSAY THYROID STIM HORMONE: CPT

## 2021-04-01 PROCEDURE — 36415 COLL VENOUS BLD VENIPUNCTURE: CPT

## 2021-04-01 PROCEDURE — 82306 VITAMIN D 25 HYDROXY: CPT

## 2021-04-01 PROCEDURE — 80053 COMPREHEN METABOLIC PANEL: CPT

## 2021-04-06 DIAGNOSIS — Z23 ENCOUNTER FOR IMMUNIZATION: ICD-10-CM

## 2021-04-08 ENCOUNTER — IMMUNIZATIONS (OUTPATIENT)
Dept: FAMILY MEDICINE CLINIC | Facility: HOSPITAL | Age: 56
End: 2021-04-08

## 2021-04-08 DIAGNOSIS — Z23 ENCOUNTER FOR IMMUNIZATION: Primary | ICD-10-CM

## 2021-04-08 PROCEDURE — 91301 SARS-COV-2 / COVID-19 MRNA VACCINE (MODERNA) 100 MCG: CPT

## 2021-04-08 PROCEDURE — 0011A SARS-COV-2 / COVID-19 MRNA VACCINE (MODERNA) 100 MCG: CPT

## 2021-05-07 ENCOUNTER — APPOINTMENT (OUTPATIENT)
Dept: LAB | Facility: CLINIC | Age: 56
End: 2021-05-07

## 2021-05-07 ENCOUNTER — TRANSCRIBE ORDERS (OUTPATIENT)
Dept: LAB | Facility: CLINIC | Age: 56
End: 2021-05-07

## 2021-05-07 DIAGNOSIS — Z00.8 HEALTH EXAMINATION IN POPULATION SURVEY: ICD-10-CM

## 2021-05-07 DIAGNOSIS — Z00.8 HEALTH EXAMINATION IN POPULATION SURVEY: Primary | ICD-10-CM

## 2021-05-07 LAB
CHOLEST SERPL-MCNC: 162 MG/DL (ref 50–200)
EST. AVERAGE GLUCOSE BLD GHB EST-MCNC: 105 MG/DL
HBA1C MFR BLD: 5.3 %
HDLC SERPL-MCNC: 35 MG/DL
LDLC SERPL CALC-MCNC: 86 MG/DL (ref 0–100)
NONHDLC SERPL-MCNC: 127 MG/DL
TRIGL SERPL-MCNC: 207 MG/DL

## 2021-05-07 PROCEDURE — 80061 LIPID PANEL: CPT

## 2021-05-07 PROCEDURE — 83036 HEMOGLOBIN GLYCOSYLATED A1C: CPT

## 2021-05-07 PROCEDURE — 36415 COLL VENOUS BLD VENIPUNCTURE: CPT

## 2021-05-10 ENCOUNTER — IMMUNIZATIONS (OUTPATIENT)
Dept: FAMILY MEDICINE CLINIC | Facility: HOSPITAL | Age: 56
End: 2021-05-10

## 2021-05-10 DIAGNOSIS — Z23 ENCOUNTER FOR IMMUNIZATION: Primary | ICD-10-CM

## 2021-05-10 PROCEDURE — 91301 SARS-COV-2 / COVID-19 MRNA VACCINE (MODERNA) 100 MCG: CPT

## 2021-05-10 PROCEDURE — 0012A SARS-COV-2 / COVID-19 MRNA VACCINE (MODERNA) 100 MCG: CPT

## 2022-01-02 DIAGNOSIS — M17.11 PRIMARY OSTEOARTHRITIS OF RIGHT KNEE: ICD-10-CM

## 2022-01-03 RX ORDER — DICLOFENAC SODIUM 75 MG/1
TABLET, DELAYED RELEASE ORAL
Qty: 60 TABLET | Refills: 11 | Status: SHIPPED | OUTPATIENT
Start: 2022-01-03

## 2023-06-09 ENCOUNTER — OFFICE VISIT (OUTPATIENT)
Dept: OBGYN CLINIC | Facility: MEDICAL CENTER | Age: 58
End: 2023-06-09
Payer: COMMERCIAL

## 2023-06-09 ENCOUNTER — APPOINTMENT (OUTPATIENT)
Dept: RADIOLOGY | Facility: MEDICAL CENTER | Age: 58
End: 2023-06-09
Payer: COMMERCIAL

## 2023-06-09 VITALS
HEART RATE: 50 BPM | HEIGHT: 65 IN | SYSTOLIC BLOOD PRESSURE: 130 MMHG | WEIGHT: 244.8 LBS | BODY MASS INDEX: 40.79 KG/M2 | DIASTOLIC BLOOD PRESSURE: 77 MMHG

## 2023-06-09 DIAGNOSIS — M25.562 LEFT KNEE PAIN, UNSPECIFIED CHRONICITY: ICD-10-CM

## 2023-06-09 DIAGNOSIS — M25.561 BILATERAL CHRONIC KNEE PAIN: ICD-10-CM

## 2023-06-09 DIAGNOSIS — M25.562 BILATERAL CHRONIC KNEE PAIN: ICD-10-CM

## 2023-06-09 DIAGNOSIS — M25.561 RIGHT KNEE PAIN, UNSPECIFIED CHRONICITY: ICD-10-CM

## 2023-06-09 DIAGNOSIS — M17.0 PRIMARY OSTEOARTHRITIS OF BOTH KNEES: Primary | ICD-10-CM

## 2023-06-09 DIAGNOSIS — G89.29 BILATERAL CHRONIC KNEE PAIN: ICD-10-CM

## 2023-06-09 PROCEDURE — 99214 OFFICE O/P EST MOD 30 MIN: CPT | Performed by: ORTHOPAEDIC SURGERY

## 2023-06-09 PROCEDURE — 73564 X-RAY EXAM KNEE 4 OR MORE: CPT

## 2023-06-09 RX ORDER — ATORVASTATIN CALCIUM 20 MG/1
TABLET, FILM COATED ORAL
COMMUNITY
Start: 2022-01-01

## 2023-06-09 RX ORDER — ATORVASTATIN CALCIUM 20 MG/1
TABLET, FILM COATED ORAL
COMMUNITY
Start: 2023-04-17

## 2023-06-09 RX ORDER — MELOXICAM 15 MG/1
TABLET ORAL
COMMUNITY
Start: 2023-04-21

## 2023-06-09 RX ORDER — LISINOPRIL AND HYDROCHLOROTHIAZIDE 20; 12.5 MG/1; MG/1
1 TABLET ORAL DAILY
COMMUNITY
Start: 2022-07-01 | End: 2023-08-31

## 2023-06-09 NOTE — PROGRESS NOTES
"Orthopaedic Surgery - Office Note  Yandy Serna (7 y o  male)   : 1965   MRN: [id number]  Encounter Date: 2023    Chief Complaint   Patient presents with   • Left Knee - Follow-up   • Right Knee - Follow-up       Assessment / Plan  Bilateral knee osteoarthritis    · The patient has an examination consistent with bilateral knee osteoarthritis  I have discussed with the patient the pathophysiology of this diagnosis and reviewed how the examination correlates with this diagnosis  Surgical vs conservative treatment options were discussed at length to included CS injection, PT, un- brace, visco supplemenation and total knee arthroplasty  After discussing these treatment options, the patient elected proceed with visco supplementation  Patient has failed conservative therapy? Yes PT directed HEP and Meloxicam  Patient has lost weight  Patient is symptomatic (pain that interferes with ADL's, sleep, crepitus, or knee stiffness? Yes   · Patient's pain score  7/10  Return for Visco injections  History of Present Illness  Yandy Serna is a 62 y o  male who presents today for follow up of bilateral knee pain  Patient has know bilateral knee OA  He has been treating with periodic CS injections  Last injection was 20 and only last a few week  Patient has increased pain with WB activities, improved with rest      Review of Systems  Pertinent items are noted in HPI  All other systems were reviewed and are negative  Physical Exam  /77   Pulse (!) 50   Ht 5' 5\" (1 651 m)   Wt 111 kg (244 lb 12 8 oz)   BMI 40 74 kg/m²   Cons: Appears well  No apparent distress  Psych: Alert  Oriented x3  Mood and affect normal   Eyes: PERRLA, EOMI  Resp: Normal effort  No audible wheezing or stridor  CV: Palpable pulse  No discernable arrhythmia  No LE edema  Lymph:  No palpable cervical, axillary, or inguinal lymphadenopathy  Skin: Warm  No palpable masses    No visible " lesions  Neuro: Normal muscle tone  Normal and symmetric DTR's  Bilateral Knee Exam  Alignment:  Normal knee alignment  Inspection:  No swelling  No edema  No erythema  No ecchymosis  Palpation:  medial joint line tenderness  ROM:  Knee Extension 0  Knee Flexion 120  Strength:  5/5 quadriceps and hamstrings  Stability:  No objective knee instability  Stable Varus / Valgus stress, Lachman, and Posterior drawer  Tests:  No pertinent positive or negative tests  Patella:  Patella tracks centrally with crepitus  Neurovascular:  Sensation intact in DP/SP/Wong/Sa/T nerve distributions  2+ DP & PT pulses  Gait:  Normal     Studies Reviewed  XR of bilateral knee - severe medial compartment degenerative changes with varus alignment    Procedures  No procedures today  Medical, Surgical, Family, and Social History  The patient's medical history, family history, and social history, were reviewed and updated as appropriate  No past medical history on file  No past surgical history on file  No family history on file      Social History     Occupational History   • Not on file   Tobacco Use   • Smoking status: Never   • Smokeless tobacco: Never   Substance and Sexual Activity   • Alcohol use: Not on file   • Drug use: Not on file   • Sexual activity: Not on file       No Known Allergies      Current Outpatient Medications:   •  atorvastatin (LIPITOR) 20 mg tablet, , Disp: , Rfl:   •  lisinopril-hydrochlorothiazide (PRINZIDE,ZESTORETIC) 20-12 5 MG per tablet, Take 1 tablet by mouth daily, Disp: , Rfl:   •  meloxicam (MOBIC) 15 mg tablet, , Disp: , Rfl:   •  metoprolol succinate (TOPROL-XL) 50 mg 24 hr tablet, Take 50 mg by mouth daily, Disp: , Rfl:   •  atorvastatin (LIPITOR) 20 mg tablet, , Disp: , Rfl:   •  Cholecalciferol 25 MCG (1000 UT) tablet, Take 1,000 Units by mouth daily, Disp: , Rfl:   •  diclofenac (VOLTAREN) 75 mg EC tablet, TAKE 1 TABLET TWICE A DAY, Disp: 60 tablet, Rfl: 11  •  diclofenac sodium (VOLTAREN) 1 %, Apply 2 g topically 4 (four) times a day (Patient not taking: Reported on 6/9/2023), Disp: 1 Tube, Rfl: 3  •  Meloxicam 15 MG TBDP, , Disp: , Rfl:   •  ofloxacin (OCUFLOX) 0 3 % ophthalmic solution, instill 1 drop into both eyes four times a day (Patient not taking: Reported on 6/9/2023), Disp: , Rfl: 0  •  quinapril-hydrochlorothiazide (ACCURETIC) 20-12 5 MG per tablet, Take 1 tablet by mouth daily, Disp: , Rfl:       Halima Salinas    I,:  Hernan Hwang am acting as a scribe while in the presence of the attending physician :       I,:  Maria L Gomes MD personally performed the services described in this documentation    as scribed in my presence :

## 2023-07-17 ENCOUNTER — PROCEDURE VISIT (OUTPATIENT)
Dept: OBGYN CLINIC | Facility: MEDICAL CENTER | Age: 58
End: 2023-07-17
Payer: COMMERCIAL

## 2023-07-17 VITALS
BODY MASS INDEX: 34.75 KG/M2 | DIASTOLIC BLOOD PRESSURE: 78 MMHG | SYSTOLIC BLOOD PRESSURE: 138 MMHG | HEART RATE: 60 BPM | HEIGHT: 69 IN | WEIGHT: 234.6 LBS

## 2023-07-17 DIAGNOSIS — M17.0 PRIMARY OSTEOARTHRITIS OF BOTH KNEES: Primary | ICD-10-CM

## 2023-07-17 PROCEDURE — 20610 DRAIN/INJ JOINT/BURSA W/O US: CPT | Performed by: PHYSICIAN ASSISTANT

## 2023-07-17 RX ORDER — BUPIVACAINE HYDROCHLORIDE 2.5 MG/ML
4 INJECTION, SOLUTION INFILTRATION; PERINEURAL
Status: COMPLETED | OUTPATIENT
Start: 2023-07-17 | End: 2023-07-17

## 2023-07-17 RX ORDER — HYALURONATE SODIUM, STABILIZED 88 MG/4 ML
SYRINGE (ML) INTRAARTICULAR
COMMUNITY
Start: 2023-06-15

## 2023-07-17 RX ADMIN — BUPIVACAINE HYDROCHLORIDE 4 ML: 2.5 INJECTION, SOLUTION INFILTRATION; PERINEURAL at 08:30

## 2023-07-17 NOTE — PROGRESS NOTES
Orthopaedic Surgery - Office Note  Levon Brown (42 y.o. male)   : 1965   MRN: [id number]  Encounter Date: 2023    Chief Complaint   Patient presents with   • Left Knee - Follow-up   • Right Knee - Follow-up       Assessment / Plan  Bilateral knee osteoarthritis    He did have a discussion about treatment options for osteoarthritis of the knees going forward including surgical versus conservative treatment. He would like to continue with conservative treatment at this time. After discussion of risk and benefits the patient would like to proceed with bilateral Monovisc injections. These were prepared and injected sterilely and tolerated well. Continue with ice and analgesics/NSAIDs as needed. Patient has failed conservative therapy? Yes PT directed HEP and Meloxicam  Continue to pursue weight loss. · Patient's pain score at this time 7 out of 10 bilaterally with activity. Return in about 3 months (around 10/17/2023) for follow up with Martell Cagle. History of Present Illness  Levon Brown is a 62 y.o. male who presents today for follow up of bilateral knee pain. Patient has know bilateral knee OA. He has been treating with periodic CS injections. Last injection was 20 and only last a few week. Patient has increased pain with WB activities medial joint line of the knees with the right being worse at this time. Pain does improve with rest.  The patient has had extensive PT in the past and does do a home exercise program guided by his wife who is a therapist.  Patient would like to proceed with viscosupplementation injections at this time. Review of Systems  Pertinent items are noted in HPI. All other systems were reviewed and are negative. Physical Exam  /78   Pulse 60   Ht 5' 8.5" (1.74 m)   Wt 106 kg (234 lb 9.6 oz)   BMI 35.15 kg/m²   Cons: Appears well. No apparent distress. Psych: Alert. Oriented x3.   Mood and affect normal.  Eyes: PERRLA, EOMI  Resp: Normal effort. No audible wheezing or stridor. CV: Palpable pulse. No discernable arrhythmia. No LE edema. Lymph:  No palpable cervical, axillary, or inguinal lymphadenopathy. Skin: Warm. No palpable masses. No visible lesions. Neuro: Normal muscle tone. Normal and symmetric DTR's. Bilateral Knee Exam  Alignment:  Normal knee alignment. Inspection:  No swelling. No edema. No erythema. No ecchymosis. Palpation:  medial joint line tenderness. ROM:  Knee Extension 0. Knee Flexion 120. Strength:  5/5 quadriceps and hamstrings. Stability:  No objective knee instability. Stable Varus / Valgus stress, Lachman, and Posterior drawer. Tests:  No pertinent positive or negative tests. Patella:  Patella tracks centrally with crepitus. Neurovascular:  Sensation intact in DP/SP/Wong/Sa/T nerve distributions. 2+ DP & PT pulses. Gait:  Normal.    Studies Reviewed  XR of bilateral knee - severe medial compartment degenerative changes with varus alignment    Large joint arthrocentesis: bilateral knee  Universal Protocol:  Consent: Verbal consent obtained. Consent given by: patient  Patient identity confirmed: verbally with patient    Supporting Documentation  Indications: pain   Procedure Details  Location: knee - bilateral knee  Needle size: 22 G  Approach: lateral    Medications (Right): 4 mL bupivacaine 0.25 %; 88 mg hyaluronan 88 MG/4MLMedications (Left): 4 mL bupivacaine 0.25 %; 88 mg hyaluronan 88 MG/4ML   Patient tolerance: patient tolerated the procedure well with no immediate complications  Dressing:  Sterile dressing applied             Medical, Surgical, Family, and Social History  The patient's medical history, family history, and social history, were reviewed and updated as appropriate. History reviewed. No pertinent past medical history. History reviewed. No pertinent surgical history. History reviewed. No pertinent family history.     Social History     Occupational History   • Not on file   Tobacco Use   • Smoking status: Never   • Smokeless tobacco: Never   Substance and Sexual Activity   • Alcohol use: Not on file   • Drug use: Not on file   • Sexual activity: Not on file       No Known Allergies      Current Outpatient Medications:   •  Cholecalciferol 25 MCG (1000 UT) tablet, Take 1,000 Units by mouth daily, Disp: , Rfl:   •  lisinopril-hydrochlorothiazide (PRINZIDE,ZESTORETIC) 20-12.5 MG per tablet, Take 1 tablet by mouth daily, Disp: , Rfl:   •  meloxicam (MOBIC) 15 mg tablet, , Disp: , Rfl:   •  metoprolol succinate (TOPROL-XL) 50 mg 24 hr tablet, Take 50 mg by mouth daily, Disp: , Rfl:   •  Monovisc injection, , Disp: , Rfl:   •  atorvastatin (LIPITOR) 20 mg tablet, , Disp: , Rfl:   •  atorvastatin (LIPITOR) 20 mg tablet, , Disp: , Rfl:   •  diclofenac sodium (VOLTAREN) 1 %, Apply 2 g topically 4 (four) times a day (Patient not taking: Reported on 6/9/2023), Disp: 1 Tube, Rfl: 3  •  Meloxicam 15 MG TBDP, , Disp: , Rfl:   •  ofloxacin (OCUFLOX) 0.3 % ophthalmic solution, instill 1 drop into both eyes four times a day (Patient not taking: Reported on 6/9/2023), Disp: , Rfl: 0      Verito Lan PA-C    Scribe Attestation    I,:   am acting as a scribe while in the presence of the attending physician.:       I,:   personally performed the services described in this documentation    as scribed in my presence.:

## 2023-07-29 ENCOUNTER — APPOINTMENT (OUTPATIENT)
Dept: LAB | Facility: CLINIC | Age: 58
End: 2023-07-29
Payer: COMMERCIAL

## 2023-07-29 ENCOUNTER — TRANSCRIBE ORDERS (OUTPATIENT)
Dept: LAB | Facility: CLINIC | Age: 58
End: 2023-07-29

## 2023-07-29 DIAGNOSIS — Z00.8 HEALTH EXAMINATION IN POPULATION SURVEY: ICD-10-CM

## 2023-07-29 DIAGNOSIS — Z00.8 HEALTH EXAMINATION IN POPULATION SURVEY: Primary | ICD-10-CM

## 2023-07-29 LAB
CHOLEST SERPL-MCNC: 208 MG/DL
EST. AVERAGE GLUCOSE BLD GHB EST-MCNC: 100 MG/DL
HBA1C MFR BLD: 5.1 %
HDLC SERPL-MCNC: 51 MG/DL
LDLC SERPL CALC-MCNC: 141 MG/DL (ref 0–100)
NONHDLC SERPL-MCNC: 157 MG/DL
TRIGL SERPL-MCNC: 78 MG/DL

## 2023-07-29 PROCEDURE — 80061 LIPID PANEL: CPT

## 2023-07-29 PROCEDURE — 83036 HEMOGLOBIN GLYCOSYLATED A1C: CPT

## 2023-07-29 PROCEDURE — 36415 COLL VENOUS BLD VENIPUNCTURE: CPT

## 2023-10-18 ENCOUNTER — OFFICE VISIT (OUTPATIENT)
Dept: OBGYN CLINIC | Facility: OTHER | Age: 58
End: 2023-10-18
Payer: COMMERCIAL

## 2023-10-18 VITALS
DIASTOLIC BLOOD PRESSURE: 76 MMHG | HEART RATE: 54 BPM | HEIGHT: 68 IN | BODY MASS INDEX: 33.83 KG/M2 | SYSTOLIC BLOOD PRESSURE: 133 MMHG | WEIGHT: 223.2 LBS

## 2023-10-18 DIAGNOSIS — M17.0 PRIMARY OSTEOARTHRITIS OF BOTH KNEES: Primary | ICD-10-CM

## 2023-10-18 PROCEDURE — 20610 DRAIN/INJ JOINT/BURSA W/O US: CPT | Performed by: PHYSICIAN ASSISTANT

## 2023-10-18 PROCEDURE — 99213 OFFICE O/P EST LOW 20 MIN: CPT | Performed by: PHYSICIAN ASSISTANT

## 2023-10-18 RX ORDER — TRIAMCINOLONE ACETONIDE 40 MG/ML
40 INJECTION, SUSPENSION INTRA-ARTICULAR; INTRAMUSCULAR
Status: COMPLETED | OUTPATIENT
Start: 2023-10-18 | End: 2023-10-18

## 2023-10-18 RX ORDER — BUPIVACAINE HYDROCHLORIDE 2.5 MG/ML
4 INJECTION, SOLUTION INFILTRATION; PERINEURAL
Status: COMPLETED | OUTPATIENT
Start: 2023-10-18 | End: 2023-10-18

## 2023-10-18 RX ADMIN — BUPIVACAINE HYDROCHLORIDE 4 ML: 2.5 INJECTION, SOLUTION INFILTRATION; PERINEURAL at 09:30

## 2023-10-18 RX ADMIN — TRIAMCINOLONE ACETONIDE 40 MG: 40 INJECTION, SUSPENSION INTRA-ARTICULAR; INTRAMUSCULAR at 09:30

## 2023-10-18 NOTE — PROGRESS NOTES
Orthopaedic Surgery - Office Note  Sumi Rdz (23 y.o. male)   : 1965   MRN: [id number]  Encounter Date: 10/18/2023    Chief Complaint   Patient presents with    Left Knee - Follow-up    Right Knee - Follow-up       Assessment / Plan  Bilateral knee osteoarthritis    He did have a discussion about treatment options for osteoarthritis of the knees going forward including surgical versus conservative treatment. He does feel that he is ready to consider knee replacement. His plan would be to proceed with knee replacement in mid 2024. At this time I did offer him steroid injection since it would be more than 3 months until he is considering surgery. He did agree to proceed with these at this time. After discussion of risk and benefits the patient would like to proceed with bilateral steroid injections. These were prepared and injected sterilely and tolerated well. Continue with ice and analgesics/NSAIDs as needed. We did discuss postop expectations after knee replacement. Patient's BMI is currently 33.9 and he is not diabetic after review of his medical history he will most likely not have issues getting clearance for surgery but will talk to his primary care about this at his appointment in late November. Continue with activity as tolerated at this time. Return for follow up with Dr. Carmen Lopez in early 2024. Plan is to discuss possible knee replacement in February at that time. History of Present Illness  Sumi Rdz is a 62 y.o. male following up for bilateral knee pain secondary to osteoarthritis. Patient's last visit on 2023 he did undergo bilateral Monovisc injections which he feels made very little difference. He has been treating with periodic CS injections. Last injection was 20 and only last a few week. Patient has increased pain with WB activities medial joint line of the knees with the right being worse at this time.   Pain does improve with rest.  The patient has had extensive PT in the past and does do a home exercise program guided by his wife who is a therapist.  Patient would like to proceed with knee replacement surgery early 2024. Review of Systems  Pertinent items are noted in HPI. All other systems were reviewed and are negative. Physical Exam  /76   Pulse (!) 54   Ht 5' 8" (1.727 m)   Wt 101 kg (223 lb 3.2 oz)   BMI 33.94 kg/m²   Cons: Appears well. No apparent distress. Psych: Alert. Oriented x3. Mood and affect normal.  Eyes: PERRLA, EOMI  Resp: Normal effort. No audible wheezing or stridor. CV: Palpable pulse. No discernable arrhythmia. No LE edema. Lymph:  No palpable cervical, axillary, or inguinal lymphadenopathy. Skin: Warm. No palpable masses. No visible lesions. Neuro: Normal muscle tone. Normal and symmetric DTR's. Bilateral Knee Exam  Alignment:  Normal knee alignment. Inspection:  No swelling. No edema. No erythema. No ecchymosis. Palpation:   medial joint line tenderness. ROM:  Knee Extension 0. Knee Flexion 120. Strength:  5/5 quadriceps and hamstrings. Stability:  No objective knee instability. Stable Varus / Valgus stress, Lachman, and Posterior drawer. Tests:  No pertinent positive or negative tests. Patella:  Patella tracks centrally with crepitus. Neurovascular:  Sensation intact in DP/SP/Wong/Sa/T nerve distributions. 2+ DP & PT pulses. Gait:  Normal.    Studies Reviewed  XR of bilateral knee - from June 9, 2023 show severe medial compartment degenerative changes with varus alignment bilaterally    Large joint arthrocentesis: bilateral knee  Universal Protocol:  Consent: Verbal consent obtained.   Consent given by: patient  Patient identity confirmed: verbally with patient  Supporting Documentation  Indications: pain   Procedure Details  Location: knee - bilateral knee  Needle size: 25 G  Ultrasound guidance: no  Approach: anterolateral    Medications (Right): 4 mL bupivacaine 0.25 %; 40 mg triamcinolone acetonide 40 mg/mLMedications (Left): 4 mL bupivacaine 0.25 %; 40 mg triamcinolone acetonide 40 mg/mL   Patient tolerance: patient tolerated the procedure well with no immediate complications  Dressing:  Sterile dressing applied             Medical, Surgical, Family, and Social History  The patient's medical history, family history, and social history, were reviewed and updated as appropriate. History reviewed. No pertinent past medical history. History reviewed. No pertinent surgical history. History reviewed. No pertinent family history.     Social History     Occupational History    Not on file   Tobacco Use    Smoking status: Never    Smokeless tobacco: Never   Substance and Sexual Activity    Alcohol use: Not on file    Drug use: Not on file    Sexual activity: Not on file       No Known Allergies      Current Outpatient Medications:     atorvastatin (LIPITOR) 20 mg tablet, , Disp: , Rfl:     Cholecalciferol 25 MCG (1000 UT) tablet, Take 1,000 Units by mouth daily, Disp: , Rfl:     meloxicam (MOBIC) 15 mg tablet, , Disp: , Rfl:     metoprolol succinate (TOPROL-XL) 50 mg 24 hr tablet, Take 50 mg by mouth daily, Disp: , Rfl:     Monovisc injection, , Disp: , Rfl:     atorvastatin (LIPITOR) 20 mg tablet, , Disp: , Rfl:     diclofenac sodium (VOLTAREN) 1 %, Apply 2 g topically 4 (four) times a day (Patient not taking: Reported on 6/9/2023), Disp: 1 Tube, Rfl: 3    lisinopril-hydrochlorothiazide (PRINZIDE,ZESTORETIC) 20-12.5 MG per tablet, Take 1 tablet by mouth daily, Disp: , Rfl:     Meloxicam 15 MG TBDP, , Disp: , Rfl:     ofloxacin (OCUFLOX) 0.3 % ophthalmic solution, instill 1 drop into both eyes four times a day (Patient not taking: Reported on 6/9/2023), Disp: , Rfl: 0      Judie Dakins, PA-C    Scribe Attestation      I,:   am acting as a scribe while in the presence of the attending physician.:       I,:   personally performed the services described in this documentation as scribed in my presence.:

## 2024-01-05 ENCOUNTER — OFFICE VISIT (OUTPATIENT)
Dept: OBGYN CLINIC | Facility: MEDICAL CENTER | Age: 59
End: 2024-01-05
Payer: COMMERCIAL

## 2024-01-05 ENCOUNTER — APPOINTMENT (OUTPATIENT)
Dept: RADIOLOGY | Facility: MEDICAL CENTER | Age: 59
End: 2024-01-05
Payer: COMMERCIAL

## 2024-01-05 VITALS
SYSTOLIC BLOOD PRESSURE: 128 MMHG | HEART RATE: 60 BPM | BODY MASS INDEX: 33.69 KG/M2 | WEIGHT: 222.3 LBS | HEIGHT: 68 IN | DIASTOLIC BLOOD PRESSURE: 73 MMHG

## 2024-01-05 DIAGNOSIS — M17.0 PRIMARY OSTEOARTHRITIS OF BOTH KNEES: ICD-10-CM

## 2024-01-05 DIAGNOSIS — M17.0 PRIMARY OSTEOARTHRITIS OF BOTH KNEES: Primary | ICD-10-CM

## 2024-01-05 PROCEDURE — 99214 OFFICE O/P EST MOD 30 MIN: CPT | Performed by: ORTHOPAEDIC SURGERY

## 2024-01-05 PROCEDURE — 77073 BONE LENGTH STUDIES: CPT

## 2024-01-05 RX ORDER — MULTIVITAMIN
1 TABLET ORAL DAILY
Qty: 30 TABLET | Refills: 1 | Status: SHIPPED | OUTPATIENT
Start: 2024-01-05

## 2024-01-05 RX ORDER — FERROUS SULFATE 324(65)MG
324 TABLET, DELAYED RELEASE (ENTERIC COATED) ORAL
Qty: 60 TABLET | Refills: 0 | Status: SHIPPED | OUTPATIENT
Start: 2024-01-05

## 2024-01-05 RX ORDER — TRANEXAMIC ACID 10 MG/ML
1000 INJECTION, SOLUTION INTRAVENOUS ONCE
OUTPATIENT
Start: 2024-01-05 | End: 2024-01-05

## 2024-01-05 RX ORDER — CHLORHEXIDINE GLUCONATE ORAL RINSE 1.2 MG/ML
15 SOLUTION DENTAL ONCE
OUTPATIENT
Start: 2024-01-05 | End: 2024-01-05

## 2024-01-05 RX ORDER — FOLIC ACID 1 MG/1
1 TABLET ORAL DAILY
Qty: 30 TABLET | Refills: 0 | Status: SHIPPED | OUTPATIENT
Start: 2024-01-05

## 2024-01-05 RX ORDER — CEFAZOLIN SODIUM 2 G/50ML
2000 SOLUTION INTRAVENOUS ONCE
OUTPATIENT
Start: 2024-01-05 | End: 2024-01-05

## 2024-01-05 NOTE — PROGRESS NOTES
"Orthopaedic Surgery - Office Note  Gurdeep Martin (58 y.o. male)   : 1965   MRN: [id number]  Encounter Date: 2024    Chief Complaint   Patient presents with    Right Knee - Follow-up    Left Knee - Follow-up       Assessment / Plan  Bilateral knee osteoarthritis     The diagnosis and treatment options were reviewed.  The patient wishes to proceed with Right total knee arthroplasty.  The risks, benefits, and alternatives were discussed.  Written consent was obtained.  Return for Post op.    History of Present Illness  Gurdeep Martin is a 58 y.o. male who presents for follow up regarding bilateral knee pain.  Patient has known bilateral knee OA and had been treating conservatively with visco supplementation and CS injections.  Last injections were 10/18/23.  Patient has increased pain with WB activities improved with rest.     Review of Systems  Pertinent items are noted in HPI.  All other systems were reviewed and are negative.    Physical Exam  /73   Pulse 60   Ht 5' 8\" (1.727 m)   Wt 101 kg (222 lb 4.8 oz)   BMI 33.80 kg/m²   Cons: Appears well.  No apparent distress.  Psych: Alert. Oriented x3.  Mood and affect normal.  Eyes: PERRLA, EOMI  Resp: Normal effort.  No audible wheezing or stridor.  CV: Palpable pulse.  No discernable arrhythmia.  No LE edema.  Lymph:  No palpable cervical, axillary, or inguinal lymphadenopathy.  Skin: Warm.  No palpable masses.  No visible lesions.  Neuro: Normal muscle tone.  Normal and symmetric DTR's.     Bilateral Knee Exam  Alignment:  Normal knee alignment.  Inspection:  No swelling. No edema. No erythema. No ecchymosis.  Palpation:   medial joint line tenderness.  ROM:  Knee Extension 0. Knee Flexion 125.  Strength:  5/5 quadriceps and hamstrings.  Stability:  No objective knee instability. Stable Varus / Valgus stress, Lachman, and Posterior drawer.  Tests:  No pertinent positive or negative tests.  Patella:  Patella tracks centrally with " crepitus.  Neurovascular:  Sensation intact in DP/SP/Wong/Sa/T nerve distributions.  2+ DP & PT pulses.  Gait:  Normal.    Studies Reviewed  XR of bilateral knee - from June 9, 2023 show severe medial compartment degenerative changes with varus alignment bilaterally     Procedures  No procedures today.    Medical, Surgical, Family, and Social History  The patient's medical history, family history, and social history, were reviewed and updated as appropriate.    History reviewed. No pertinent past medical history.    History reviewed. No pertinent surgical history.    History reviewed. No pertinent family history.    Social History     Occupational History    Not on file   Tobacco Use    Smoking status: Never    Smokeless tobacco: Never   Substance and Sexual Activity    Alcohol use: Not on file    Drug use: Not on file    Sexual activity: Not on file       No Known Allergies      Current Outpatient Medications:     aspirin (ECOTRIN LOW STRENGTH) 81 mg EC tablet, Take 81 mg by mouth daily, Disp: , Rfl:     atorvastatin (LIPITOR) 20 mg tablet, , Disp: , Rfl:     Cholecalciferol 25 MCG (1000 UT) tablet, Take 1,000 Units by mouth daily, Disp: , Rfl:     Magnesium 80 MG TABS, Take by mouth, Disp: , Rfl:     meloxicam (MOBIC) 15 mg tablet, , Disp: , Rfl:     Meloxicam 15 MG TBDP, , Disp: , Rfl:     metoprolol succinate (TOPROL-XL) 50 mg 24 hr tablet, Take 50 mg by mouth daily, Disp: , Rfl:     atorvastatin (LIPITOR) 20 mg tablet, , Disp: , Rfl:     diclofenac sodium (VOLTAREN) 1 %, Apply 2 g topically 4 (four) times a day (Patient not taking: Reported on 6/9/2023), Disp: 1 Tube, Rfl: 3    lisinopril-hydrochlorothiazide (PRINZIDE,ZESTORETIC) 20-12.5 MG per tablet, Take 1 tablet by mouth daily, Disp: , Rfl:     Monovisc injection, , Disp: , Rfl:     ofloxacin (OCUFLOX) 0.3 % ophthalmic solution, instill 1 drop into both eyes four times a day (Patient not taking: Reported on 6/9/2023), Disp: , Rfl: 0      Tierra  Violet    Scribe Attestation      I,:  Tierra Lazo am acting as a scribe while in the presence of the attending physician.:       I,:  Migue Martin MD personally performed the services described in this documentation    as scribed in my presence.:

## 2024-01-12 ENCOUNTER — TELEPHONE (OUTPATIENT)
Age: 59
End: 2024-01-12

## 2024-01-12 NOTE — TELEPHONE ENCOUNTER
Caller: Patient   Doctor: Mario     Reason for call: Asked if we received paperwork, I did tell the patient yes it was received and told him about our turn around time

## 2024-01-16 ENCOUNTER — TELEPHONE (OUTPATIENT)
Dept: OBGYN CLINIC | Facility: HOSPITAL | Age: 59
End: 2024-01-16

## 2024-01-16 NOTE — TELEPHONE ENCOUNTER
Preoperative Elective Admission Assessment- spoke with patient .    Living Situation:    Who does pt live with: spouse  What kind of home: multi-level  How do they enter the home: front  How many levels in home: 2 level home    # of steps to enter home: 5 to enter   # of steps to second floor: 12-14 to 2nd floor   Are there handrails: Yes  Are there landings: No  Sleeping arrangement: first/entry floor  Where is Bathroom: First floor bath with a walk in shower, with grab bar     First Floor Setup:   Is there a bathroom: Yes  Where would pt sleep: couch, bed, and recliner     DME: cane (patients wife getting RW from work).    We discussed clearing pathways in the home and making sure there is accessibly to use the walker, for example, removing throw rugs.      Patient's Current Level of Function: Ambulates: Independently and ADLs: Independent    Post-op Caregiver: spouse  Currently receive any HHC/aides/community supports: No     Post-op Transport: spouse  To/from hospital: spouse  To/from PT 2-3x/week: spouse (took a few days off)  Uses community transport now: No     Outpatient Physical Therapy Site:  Site:  [facility]   pre and post-op appts scheduled? Yes     Medication Management: self  Preferred Pharmacy for Post-op Medications: [facility] [place] - [facility] - [place] [[id number]]   Blood Management Vitamin Regimen:  Has at home to start 30 days before surgery   Post-op anticoagulant: to be determined by surgical team postoperatively     DC Plan: Pt plans to be discharged home    Barriers to DC identified preoperatively: none identified    BMI: 33.80    Patient Education:  Pt educated on post-op pain, early mobilization (POD0), LOS goals, OP PT goals, and preoperative bathing. Patient educated that our goal is to appropriately discharge patient based off their post-op function while striving to maintain maximal independence. The goal is to discharge patient to home and for them to attend outpatient  physical therapy.    Assigned to care team? Yes

## 2024-01-18 ENCOUNTER — TELEPHONE (OUTPATIENT)
Age: 59
End: 2024-01-18

## 2024-01-18 NOTE — TELEPHONE ENCOUNTER
Caller: Patient    Doctor: armida     Reason for call:States job is asking when he is return to work, Patient states he is expects to return on  4/8/24    Call back#: [phone]

## 2024-01-18 NOTE — TELEPHONE ENCOUNTER
Pt called in stating he will not have access to the RW like oroginally planned during his assessment.     I placed an order at this time.    PROVIDED [facility] NUMBER TO CONTACT: [phone]    pt encouraged to call me with questions, concerns or issues.

## 2024-01-20 LAB
DME PARACHUTE DELIVERY DATE ACTUAL: NORMAL
DME PARACHUTE DELIVERY DATE REQUESTED: NORMAL
DME PARACHUTE ITEM DESCRIPTION: NORMAL
DME PARACHUTE ORDER STATUS: NORMAL
DME PARACHUTE SUPPLIER NAME: NORMAL
DME PARACHUTE SUPPLIER PHONE: NORMAL

## 2024-02-02 ENCOUNTER — OFFICE VISIT (OUTPATIENT)
Dept: LAB | Facility: HOSPITAL | Age: 59
End: 2024-02-02
Payer: COMMERCIAL

## 2024-02-02 ENCOUNTER — APPOINTMENT (OUTPATIENT)
Dept: LAB | Facility: HOSPITAL | Age: 59
End: 2024-02-02
Payer: COMMERCIAL

## 2024-02-02 ENCOUNTER — LAB REQUISITION (OUTPATIENT)
Dept: LAB | Facility: HOSPITAL | Age: 59
End: 2024-02-02
Payer: COMMERCIAL

## 2024-02-02 DIAGNOSIS — Z01.818 ENCOUNTER FOR OTHER PREPROCEDURAL EXAMINATION: ICD-10-CM

## 2024-02-02 DIAGNOSIS — Z01.818 PREOPERATIVE EXAMINATION, UNSPECIFIED: Primary | ICD-10-CM

## 2024-02-02 DIAGNOSIS — Z01.818 ENCOUNTER FOR PREADMISSION TESTING: ICD-10-CM

## 2024-02-02 DIAGNOSIS — M17.0 PRIMARY OSTEOARTHRITIS OF BOTH KNEES: ICD-10-CM

## 2024-02-02 DIAGNOSIS — M17.0 BILATERAL PRIMARY OSTEOARTHRITIS OF KNEE: ICD-10-CM

## 2024-02-02 LAB
ABO GROUP BLD: NORMAL
ALBUMIN SERPL BCP-MCNC: 4.3 G/DL (ref 3.5–5)
ALP SERPL-CCNC: 47 U/L (ref 34–104)
ALT SERPL W P-5'-P-CCNC: 20 U/L (ref 7–52)
ANION GAP SERPL CALCULATED.3IONS-SCNC: 6 MMOL/L
APTT PPP: 31 SECONDS (ref 23–37)
AST SERPL W P-5'-P-CCNC: 16 U/L (ref 13–39)
BASOPHILS # BLD AUTO: 0.08 THOUSANDS/ÂΜL (ref 0–0.1)
BASOPHILS NFR BLD AUTO: 1 % (ref 0–1)
BILIRUB SERPL-MCNC: 0.57 MG/DL (ref 0.2–1)
BLD GP AB SCN SERPL QL: NEGATIVE
BUN SERPL-MCNC: 28 MG/DL (ref 5–25)
CALCIUM SERPL-MCNC: 9.4 MG/DL (ref 8.4–10.2)
CHLORIDE SERPL-SCNC: 101 MMOL/L (ref 96–108)
CO2 SERPL-SCNC: 28 MMOL/L (ref 21–32)
CREAT SERPL-MCNC: 1.07 MG/DL (ref 0.6–1.3)
EOSINOPHIL # BLD AUTO: 0.28 THOUSAND/ÂΜL (ref 0–0.61)
EOSINOPHIL NFR BLD AUTO: 4 % (ref 0–6)
ERYTHROCYTE [DISTWIDTH] IN BLOOD BY AUTOMATED COUNT: 12.3 % (ref 11.6–15.1)
EST. AVERAGE GLUCOSE BLD GHB EST-MCNC: 103 MG/DL
GFR SERPL CREATININE-BSD FRML MDRD: 75 ML/MIN/1.73SQ M
GLUCOSE P FAST SERPL-MCNC: 103 MG/DL (ref 65–99)
HBA1C MFR BLD: 5.2 %
HCT VFR BLD AUTO: 43 % (ref 36.5–49.3)
HGB BLD-MCNC: 14.7 G/DL (ref 12–17)
IMM GRANULOCYTES # BLD AUTO: 0.02 THOUSAND/UL (ref 0–0.2)
IMM GRANULOCYTES NFR BLD AUTO: 0 % (ref 0–2)
INR PPP: 0.96 (ref 0.84–1.19)
LYMPHOCYTES # BLD AUTO: 1.99 THOUSANDS/ÂΜL (ref 0.6–4.47)
LYMPHOCYTES NFR BLD AUTO: 31 % (ref 14–44)
MCH RBC QN AUTO: 33.3 PG (ref 26.8–34.3)
MCHC RBC AUTO-ENTMCNC: 34.2 G/DL (ref 31.4–37.4)
MCV RBC AUTO: 98 FL (ref 82–98)
MONOCYTES # BLD AUTO: 0.78 THOUSAND/ÂΜL (ref 0.17–1.22)
MONOCYTES NFR BLD AUTO: 12 % (ref 4–12)
NEUTROPHILS # BLD AUTO: 3.25 THOUSANDS/ÂΜL (ref 1.85–7.62)
NEUTS SEG NFR BLD AUTO: 52 % (ref 43–75)
NRBC BLD AUTO-RTO: 0 /100 WBCS
PLATELET # BLD AUTO: 230 THOUSANDS/UL (ref 149–390)
PMV BLD AUTO: 9.5 FL (ref 8.9–12.7)
POTASSIUM SERPL-SCNC: 5 MMOL/L (ref 3.5–5.3)
PROT SERPL-MCNC: 6.9 G/DL (ref 6.4–8.4)
PROTHROMBIN TIME: 12.9 SECONDS (ref 11.6–14.5)
RBC # BLD AUTO: 4.41 MILLION/UL (ref 3.88–5.62)
RH BLD: POSITIVE
SODIUM SERPL-SCNC: 135 MMOL/L (ref 135–147)
SPECIMEN EXPIRATION DATE: NORMAL
WBC # BLD AUTO: 6.4 THOUSAND/UL (ref 4.31–10.16)

## 2024-02-02 PROCEDURE — 86901 BLOOD TYPING SEROLOGIC RH(D): CPT | Performed by: ORTHOPAEDIC SURGERY

## 2024-02-02 PROCEDURE — 85025 COMPLETE CBC W/AUTO DIFF WBC: CPT

## 2024-02-02 PROCEDURE — 86900 BLOOD TYPING SEROLOGIC ABO: CPT | Performed by: ORTHOPAEDIC SURGERY

## 2024-02-02 PROCEDURE — 93005 ELECTROCARDIOGRAM TRACING: CPT

## 2024-02-02 PROCEDURE — 85730 THROMBOPLASTIN TIME PARTIAL: CPT

## 2024-02-02 PROCEDURE — 80053 COMPREHEN METABOLIC PANEL: CPT

## 2024-02-02 PROCEDURE — 85610 PROTHROMBIN TIME: CPT

## 2024-02-02 PROCEDURE — 36415 COLL VENOUS BLD VENIPUNCTURE: CPT

## 2024-02-02 PROCEDURE — 86850 RBC ANTIBODY SCREEN: CPT | Performed by: ORTHOPAEDIC SURGERY

## 2024-02-02 NOTE — PRE-PROCEDURE INSTRUCTIONS
Pre-Surgery Instructions:   Medication Instructions    ascorbic Acid (VITAMIN C) 500 MG CPCR Hold day of surgery.    aspirin (ECOTRIN LOW STRENGTH) 81 mg EC tablet Stop taking 7 days prior to surgery.    atorvastatin (LIPITOR) 20 mg tablet Take day of surgery.    Cholecalciferol 25 MCG (1000 UT) tablet Stop taking 7 days prior to surgery.    ferrous sulfate 324 (65 Fe) mg Hold day of surgery.    folic acid (FOLVITE) 1 mg tablet Hold day of surgery.    lisinopril-hydrochlorothiazide (PRINZIDE,ZESTORETIC) 20-12.5 MG per tablet Hold day of surgery.    Magnesium 80 MG TABS Stop taking 7 days prior to surgery.    meloxicam (MOBIC) 15 mg tablet Stop taking 3 days prior to surgery.    metoprolol succinate (TOPROL-XL) 50 mg 24 hr tablet Take day of surgery.    Multiple Vitamin (multivitamin) tablet Hold day of surgery.   Medication instructions for day surgery reviewed. Please use only a sip of water to take your instructed medications. Avoid all over the counter vitamins, supplements and NSAIDS for one week prior to surgery per anesthesia guidelines. Tylenol is ok to take as needed.     You will receive a call one business day prior to surgery with an arrival time and hospital directions. If your surgery is scheduled on a Monday, the hospital will be calling you on the Friday prior to your surgery. If you have not heard from anyone by 8pm, please call the hospital supervisor through the hospital  at [phone]. (Pankaj [phone]).    Do not eat or drink anything after midnight the night before your surgery, including candy, mints, lifesavers, or chewing gum. Do not drink alcohol 24hrs before your surgery. Try not to smoke at least 24hrs before your surgery.       Follow the pre surgery showering instructions as listed in the “My Surgical Experience Booklet” or otherwise provided by your surgeon's office. Do not use a blade to shave the surgical area 1 week before surgery. It is okay to use a clean electric  clippers up to 24 hours before surgery. Do not apply any lotions, creams, including makeup, cologne, deodorant, or perfumes after showering on the day of your surgery. Do not use dry shampoo, hair spray, hair gel, or any type of hair products.     No contact lenses, eye make-up, or artificial eyelashes. Remove nail polish, including gel polish, and any artificial, gel, or acrylic nails if possible. Remove all jewelry including rings and body piercing jewelry.     Wear causal clothing that is easy to take on and off. Consider your type of surgery.    Keep any valuables, jewelry, piercings at home. Please bring any specially ordered equipment (sling, braces) if indicated.    Arrange for a responsible person to drive you to and from the hospital on the day of your surgery. Visitor Guidelines discussed.     Call the surgeon's office with any new illnesses, exposures, or additional questions prior to surgery.    Please reference your “My Surgical Experience Booklet” for additional information to prepare for your upcoming surgery.     Confirmed patient received Ortho Bag. Encouraged patient to read  [facility] Patient Education booklet.

## 2024-02-03 LAB
ATRIAL RATE: 53 BPM
P AXIS: 24 DEGREES
PR INTERVAL: 168 MS
QRS AXIS: 1 DEGREES
QRSD INTERVAL: 102 MS
QT INTERVAL: 398 MS
QTC INTERVAL: 373 MS
T WAVE AXIS: 3 DEGREES
VENTRICULAR RATE: 53 BPM

## 2024-02-05 PROBLEM — M17.11 PRIMARY OSTEOARTHRITIS OF RIGHT KNEE: Status: ACTIVE | Noted: 2024-02-05

## 2024-02-05 NOTE — ASSESSMENT & PLAN NOTE
Stable  Cont current medications as directed  Monitor post operative BP   Hold lisinopril/hctz the morning of surgery  OK to take metoprolol the morning of surgery

## 2024-02-05 NOTE — PATIENT INSTRUCTIONS
Contact surgical nurse  navigator with any questions regarding preoperative plan or schedule.  Stop all over the counter supplements, herbal, naturopathic  medications for 1 week prior to surgery UNLESS prescribed by your surgeon  Hold NSAIDS (i.e. advil, alleve, motrin, ibuprofen, celebrex) minimum 7 days prior to surgery  Hold Asprin minimum 7 days prior to surgery  Recommend using Tylenol ( acetaminophen ) 500mg every eight hours during the first week post discharge in conjunction with any additional pain medicine prescribed by your surgeon  Use bowel medicines prescribed by your surgeon ( colace) daily post op during the first 1-2 weeks to avoid post operative constipation issues  Call [phone] with any post discharge concerns or medical issues  The morning of surgery take only the following medication with small sip of water: metoprolol  Hold lisinopril/hctz the morning of surgery

## 2024-02-05 NOTE — PROGRESS NOTES
Internal Medicine Pre-Operative Evaluation:     Reason for Visit: Pre-operative Evaluation for Risk Stratification and Optimization    Patient ID: Gurdeep Martin is a 59 y.o. male.     Surgery: Arthroplasty of right knee  Referring Provider: Dr. Martin      Recommendations to Proceed withSurgery    Patient is considered to be Low risk for Medium risk procedure.     After evaluation and discussion with patient with emphasis that all surgery has some degree of inherent risk it is determined this procedure is of acceptable risk  medically.    Patient may proceed with planned procedure      Assessment    Pre-operative Medical Evaluation for planned surgery  Recommendations as listed in PLAN section below  Contact surgical nurse  navigator with any questions regarding preoperative plan or schedule.      Problem List Items Addressed This Visit          Cardiovascular and Mediastinum    Benign essential hypertension     Stable  Cont current medications as directed  Monitor post operative BP   Hold lisinopril/hctz the morning of surgery  OK to take metoprolol the morning of surgery              Musculoskeletal and Integument    Primary osteoarthritis of right knee     Failed outpatient conservative measures  Electing to undergo arthroplasty              Other    Mixed hyperlipidemia     Low cholesterol diet  Continue statin therapy           Obesity, unspecified     Other Visit Diagnoses       Preoperative clearance    -  Primary                 Plan:     1. Further preoperative workup as follows:   - none no further testing required may proceed with surgery    2. Medication Management/Recommendations:   - hold ACE / ARB morning of surgery unless given other instructions by your provider  - hold diuretic / water pill  morning of surgery unless given other instructions by your provider  - hold aspirin 7 days prior to surgery  - avoid use of NSAID such as motrin, advil, aleve for 7 days prior to surgery  - hold all  "OTC herbal or nutritional supplements 7 days before surgery    3. Patient requires further consultation with:   No Consults Required    4. Discharge Planning / Barriers to Discharge  none identified - patients has post discharge therapy plan in place, transportation arranged for discharge day, adequate family support at home to assist with discharge to home.        Subjective:           History of Present Illness:     Gurdeep Martin is a 59 y.o. male who presents to the office today for a preoperative consultation at the request of surgeon. The patient understands this is an elective procedure and not emergent. They are electing to undergo planned procedure with an understanding that all surgery has inherent risk. They have worked with their surgeon and failed conservative treatment measures. Today they present for preoperative risk assessment and recommendations for optimization in preparation for surgery.    Pt seen in surgical optimization center for upcoming proposed surgery. They have failed previous conservative measures and have elected surgical intervention.     Pt meets presurgical lab and BMI optimization goals.    Upon interview questioning patient is able to perform greater than 4 METs workload in daily life without any reported cardio-pulmonary symptoms.          ROS: No TIA's or unusual headaches, no dysphagia.  No prolonged cough. No dyspnea or chest pain on exertion.  No abdominal pain, change in bowel habits, black or bloody stools.  No urinary tract or BPH symptoms.  Positive reported pain in arthritic joint. Positive difficulty with gait. No skin rashes or issues.      Objective:    /73   Pulse 65   Ht 5' 8\" (1.727 m)   Wt 102 kg (225 lb 3.2 oz)   BMI 34.24 kg/m²       General Appearance: no distress, conversive  HEENT: PERRLA, conjuctiva normal; oropharynx clear; mucous membranes moist;   Neck:  Supple, no lymphadenopathy or thyromegaly  Lungs: breath sounds normal, normal respiratory " effort, no retractions, expiratory effort normal  CV: normal heart sounds S1/S2, PMI normal   ABD: soft non tender, no masses , no hepatic or splenomegaly  EXT: DP pulses intact, no lymphadenopathy, no edema  Skin: normal turgor, normal texture, no rash  Psych: affect normal, mood normal  Neuro: AAOx3        The following portions of the patient's history were reviewed and updated as appropriate: allergies, current medications, past family history, past medical history, past social history, past surgical history and problem list.     Past History:       Past Medical History:   Diagnosis Date    Arthritis     Gout     Hyperlipidemia     Hypertension     Past Surgical History:   Procedure Laterality Date    CARPAL TUNNEL RELEASE Bilateral     COLONOSCOPY      NEUROMA EXCISION Left     Foot          Social History     Tobacco Use    Smoking status: Never    Smokeless tobacco: Never   Vaping Use    Vaping status: Never Used   Substance Use Topics    Alcohol use: Yes     Alcohol/week: 3.0 standard drinks of alcohol     Types: 3 Standard drinks or equivalent per week    Drug use: Never     History reviewed. No pertinent family history.       Allergies:     No Known Allergies     Current Medications:     Current Outpatient Medications   Medication Instructions    ascorbic Acid (VITAMIN C) 500 mg, Oral, Daily    aspirin (ECOTRIN LOW STRENGTH) 81 mg, Oral, Daily    atorvastatin (LIPITOR) 20 mg tablet No dose, route, or frequency recorded.    Cholecalciferol 1,000 Units, Oral, Daily    ferrous sulfate 324 mg, Oral, 2 times daily before meals    folic acid (FOLVITE) 1 mg, Oral, Daily    lisinopril-hydrochlorothiazide (PRINZIDE,ZESTORETIC) 20-12.5 MG per tablet 1 tablet, Oral, Daily    Magnesium 80 MG TABS Oral    meloxicam (MOBIC) 15 mg tablet No dose, route, or frequency recorded.    metoprolol succinate (TOPROL-XL) 50 mg, Oral, Daily    Multiple Vitamin (multivitamin) tablet 1 tablet, Oral, Daily           PRE-OP WORKSHEET  "DATA    Assessment of Pre-Operative Risks     [facility] Quality Hard Stops:  BMI (<40) : Estimated body mass index is 34.24 kg/m² as calculated from the following:    Height as of this encounter: 5' 8\" (1.727 m).    Weight as of this encounter: 102 kg (225 lb 3.2 oz).    Hgb ( >11):   Lab Results   Component Value Date    HGB 14.7 02/02/2024     HbA1c (<7.5) :   Lab Results   Component Value Date    HGBA1C 5.2 02/02/2024     GFR (>60) (Less then 45 = Nephrology consult):  Estimated Creatinine Clearance: 86 mL/min (by C-G formula based on SCr of 1.07 mg/dL).      Active Decompensated Chronic Conditions which would delay surgery  No acutely decompensated medical issues such as recent CVA, MI, new onset arrhythmia, severe aortic stenosis, CHF, uncontrolled COPD       Exercise Capacity: (if less the 4 mets consider functional assessment via cardiac stress testing or consultation)    Able to walk 2 blocks without symptoms?: Yes  Able to walk 1 flights without symptoms?: Yes    Assessment of intra and post operative respiratory, hemodynamic and thrombotic risks     Prior Anesthesia Reactions: No     Personal history of venous thromboembolic disease? No    History of steroid use > 5 mg for >2 weeks within last year? No      The patient's risk factors for cardiac complications include :  none    Gurdeep Martin has an IN HOSPITAL cardiac risk of RCI RISK CLASS I (0 risk factors, risk of major cardiac compl. appr. 0.5%) based on RCRI calculator    Cardiac Risk Estimation: per the Revised Cardiac Risk Index (Circ. 100:1043, 1999),        Pre-Op Data Reviewed:       Laboratory Results: I have personally reviewed the pertinent laboratory results/reports     EKG:I have personally reviewed pertinent reports.  . I personally reviewed and interpreted available tracings in the electronic medical record    Sinus bradycardia  Cannot rule out Anterior infarct , age undetermined  Abnormal ECG  When compared with ECG of 28-JUN-2012 12:41,  No " significant change was found  Confirmed by Anderson Harper ([id number]) on 2/3/2024    OLD RECORDS: reviewed old records in the chart review section if EHR on day of visit.    Previous cardiopulmonary studies within the past year:  Echocardiogram: no  Cardiac Catheterization: no  Stress Test: no      Time of visit including pre-visit chart review, visit and post-visit coordination of plan and care , review of pre-surgical lab work, preparation and time spent documenting note in electronic medical record, time spent face-to-face in physical examination answering patient questions by care team 45 minutes             [facility]

## 2024-02-08 ENCOUNTER — OFFICE VISIT (OUTPATIENT)
Age: 59
End: 2024-02-08

## 2024-02-08 VITALS
SYSTOLIC BLOOD PRESSURE: 133 MMHG | DIASTOLIC BLOOD PRESSURE: 73 MMHG | BODY MASS INDEX: 34.13 KG/M2 | HEART RATE: 65 BPM | WEIGHT: 225.2 LBS | HEIGHT: 68 IN

## 2024-02-08 DIAGNOSIS — M17.11 PRIMARY OSTEOARTHRITIS OF RIGHT KNEE: ICD-10-CM

## 2024-02-08 DIAGNOSIS — E66.09 CLASS 1 OBESITY DUE TO EXCESS CALORIES WITHOUT SERIOUS COMORBIDITY WITH BODY MASS INDEX (BMI) OF 33.0 TO 33.9 IN ADULT: ICD-10-CM

## 2024-02-08 DIAGNOSIS — M17.0 PRIMARY OSTEOARTHRITIS OF BOTH KNEES: ICD-10-CM

## 2024-02-08 DIAGNOSIS — E78.2 MIXED HYPERLIPIDEMIA: ICD-10-CM

## 2024-02-08 DIAGNOSIS — I10 BENIGN ESSENTIAL HYPERTENSION: ICD-10-CM

## 2024-02-08 DIAGNOSIS — Z01.818 PREOPERATIVE CLEARANCE: Primary | ICD-10-CM

## 2024-02-20 ENCOUNTER — ANESTHESIA EVENT (OUTPATIENT)
Dept: PERIOP | Facility: HOSPITAL | Age: 59
End: 2024-02-20
Payer: COMMERCIAL

## 2024-02-22 ENCOUNTER — HOSPITAL ENCOUNTER (OUTPATIENT)
Facility: HOSPITAL | Age: 59
Setting detail: OUTPATIENT SURGERY
Discharge: HOME | End: 2024-02-22
Attending: ORTHOPAEDIC SURGERY | Admitting: ORTHOPAEDIC SURGERY
Payer: COMMERCIAL

## 2024-02-22 ENCOUNTER — ANESTHESIA (OUTPATIENT)
Dept: PERIOP | Facility: HOSPITAL | Age: 59
End: 2024-02-22
Payer: COMMERCIAL

## 2024-02-22 VITALS
WEIGHT: 218.26 LBS | DIASTOLIC BLOOD PRESSURE: 78 MMHG | RESPIRATION RATE: 16 BRPM | HEIGHT: 68 IN | OXYGEN SATURATION: 97 % | HEART RATE: 50 BPM | TEMPERATURE: 97.2 F | BODY MASS INDEX: 33.08 KG/M2 | SYSTOLIC BLOOD PRESSURE: 143 MMHG

## 2024-02-22 DIAGNOSIS — M17.11 PRIMARY OSTEOARTHRITIS OF RIGHT KNEE: Primary | ICD-10-CM

## 2024-02-22 LAB
ABO GROUP BLD: NORMAL
RH BLD: POSITIVE

## 2024-02-22 PROCEDURE — 27447 TOTAL KNEE ARTHROPLASTY: CPT | Performed by: ORTHOPAEDIC SURGERY

## 2024-02-22 PROCEDURE — C9290 INJ, BUPIVACAINE LIPOSOME: HCPCS | Performed by: ANESTHESIOLOGY

## 2024-02-22 PROCEDURE — 27447 TOTAL KNEE ARTHROPLASTY: CPT | Performed by: PHYSICIAN ASSISTANT

## 2024-02-22 PROCEDURE — C1776 JOINT DEVICE (IMPLANTABLE): HCPCS | Performed by: ORTHOPAEDIC SURGERY

## 2024-02-22 PROCEDURE — C1713 ANCHOR/SCREW BN/BN,TIS/BN: HCPCS | Performed by: ORTHOPAEDIC SURGERY

## 2024-02-22 PROCEDURE — 97163 PT EVAL HIGH COMPLEX 45 MIN: CPT

## 2024-02-22 PROCEDURE — 97166 OT EVAL MOD COMPLEX 45 MIN: CPT

## 2024-02-22 DEVICE — ATTUNE KNEE SYSTEM TIBIAL INSERT FIXED BEARING POSTERIOR STABILIZED 6 5MM AOX
Type: IMPLANTABLE DEVICE | Site: KNEE | Status: FUNCTIONAL
Brand: ATTUNE

## 2024-02-22 DEVICE — ATTUNE KNEE SYSTEM TIBIAL BASE AFFIXIUM FIXED BEARING SIZE 6
Type: IMPLANTABLE DEVICE | Site: KNEE | Status: FUNCTIONAL
Brand: ATTUNE AFFIXIUM

## 2024-02-22 DEVICE — ATTUNE FEMORAL POROCOAT POSTERIOR STABILIZED SIZE 6 RIGHT CEMENTLESS
Type: IMPLANTABLE DEVICE | Site: KNEE | Status: FUNCTIONAL
Brand: ATTUNE

## 2024-02-22 DEVICE — DEPUY CMW 2 FAST SET BONE CEMENT 20G: Type: IMPLANTABLE DEVICE | Site: KNEE | Status: FUNCTIONAL

## 2024-02-22 DEVICE — ATTUNE PATELLA MEDIALIZED DOME 38MM CEMENTED AOX
Type: IMPLANTABLE DEVICE | Site: KNEE | Status: FUNCTIONAL
Brand: ATTUNE

## 2024-02-22 RX ORDER — ONDANSETRON 2 MG/ML
4 INJECTION INTRAMUSCULAR; INTRAVENOUS EVERY 6 HOURS PRN
Status: CANCELLED | OUTPATIENT
Start: 2024-02-22

## 2024-02-22 RX ORDER — ONDANSETRON 2 MG/ML
INJECTION INTRAMUSCULAR; INTRAVENOUS AS NEEDED
Status: DISCONTINUED | OUTPATIENT
Start: 2024-02-22 | End: 2024-02-22

## 2024-02-22 RX ORDER — CEFAZOLIN SODIUM 2 G/50ML
2000 SOLUTION INTRAVENOUS EVERY 8 HOURS
Status: CANCELLED | OUTPATIENT
Start: 2024-02-22 | End: 2024-02-23

## 2024-02-22 RX ORDER — ASPIRIN 81 MG/1
81 TABLET ORAL 2 TIMES DAILY
Qty: 60 TABLET | Refills: 0 | Status: SHIPPED | OUTPATIENT
Start: 2024-02-22 | End: 2024-02-22

## 2024-02-22 RX ORDER — TRAMADOL HYDROCHLORIDE 50 MG/1
50 TABLET ORAL EVERY 6 HOURS SCHEDULED
Status: CANCELLED | OUTPATIENT
Start: 2024-02-22

## 2024-02-22 RX ORDER — HYDROMORPHONE HCL/PF 1 MG/ML
0.5 SYRINGE (ML) INJECTION
Status: DISCONTINUED | OUTPATIENT
Start: 2024-02-22 | End: 2024-02-22 | Stop reason: HOSPADM

## 2024-02-22 RX ORDER — TRANEXAMIC ACID 10 MG/ML
1000 INJECTION, SOLUTION INTRAVENOUS ONCE
Status: COMPLETED | OUTPATIENT
Start: 2024-02-22 | End: 2024-02-22

## 2024-02-22 RX ORDER — CEPHALEXIN 500 MG/1
2000 CAPSULE ORAL EVERY 8 HOURS SCHEDULED
Qty: 8 CAPSULE | Refills: 0 | Status: SHIPPED | OUTPATIENT
Start: 2024-02-22 | End: 2024-02-22

## 2024-02-22 RX ORDER — CHLORHEXIDINE GLUCONATE ORAL RINSE 1.2 MG/ML
15 SOLUTION DENTAL ONCE
Status: COMPLETED | OUTPATIENT
Start: 2024-02-22 | End: 2024-02-22

## 2024-02-22 RX ORDER — OXYCODONE HYDROCHLORIDE 5 MG/1
5 TABLET ORAL EVERY 4 HOURS PRN
Qty: 45 TABLET | Refills: 0 | Status: SHIPPED | OUTPATIENT
Start: 2024-02-22 | End: 2024-02-22

## 2024-02-22 RX ORDER — SIMETHICONE 80 MG
80 TABLET,CHEWABLE ORAL 4 TIMES DAILY PRN
Status: CANCELLED | OUTPATIENT
Start: 2024-02-22

## 2024-02-22 RX ORDER — SODIUM CHLORIDE, SODIUM LACTATE, POTASSIUM CHLORIDE, CALCIUM CHLORIDE 600; 310; 30; 20 MG/100ML; MG/100ML; MG/100ML; MG/100ML
125 INJECTION, SOLUTION INTRAVENOUS CONTINUOUS
Status: DISCONTINUED | OUTPATIENT
Start: 2024-02-22 | End: 2024-02-22 | Stop reason: HOSPADM

## 2024-02-22 RX ORDER — PANTOPRAZOLE SODIUM 40 MG/1
40 TABLET, DELAYED RELEASE ORAL DAILY
Status: CANCELLED | OUTPATIENT
Start: 2024-02-22

## 2024-02-22 RX ORDER — MEPERIDINE HYDROCHLORIDE 25 MG/ML
12.5 INJECTION INTRAMUSCULAR; INTRAVENOUS; SUBCUTANEOUS
Status: DISCONTINUED | OUTPATIENT
Start: 2024-02-22 | End: 2024-02-22 | Stop reason: HOSPADM

## 2024-02-22 RX ORDER — OXYCODONE HYDROCHLORIDE 10 MG/1
10 TABLET ORAL EVERY 4 HOURS PRN
Status: CANCELLED | OUTPATIENT
Start: 2024-02-22

## 2024-02-22 RX ORDER — CEPHALEXIN 500 MG/1
2000 CAPSULE ORAL EVERY 8 HOURS SCHEDULED
Qty: 8 CAPSULE | Refills: 0 | Status: SHIPPED | OUTPATIENT
Start: 2024-02-22 | End: 2024-02-23

## 2024-02-22 RX ORDER — GABAPENTIN 300 MG/1
300 CAPSULE ORAL
Status: CANCELLED | OUTPATIENT
Start: 2024-02-22

## 2024-02-22 RX ORDER — ONDANSETRON 2 MG/ML
4 INJECTION INTRAMUSCULAR; INTRAVENOUS ONCE AS NEEDED
Status: DISCONTINUED | OUTPATIENT
Start: 2024-02-22 | End: 2024-02-22 | Stop reason: HOSPADM

## 2024-02-22 RX ORDER — SENNOSIDES 8.6 MG
1 TABLET ORAL DAILY
Status: CANCELLED | OUTPATIENT
Start: 2024-02-22

## 2024-02-22 RX ORDER — ACETAMINOPHEN 325 MG/1
650 TABLET ORAL EVERY 6 HOURS SCHEDULED
Status: CANCELLED | OUTPATIENT
Start: 2024-02-22

## 2024-02-22 RX ORDER — MIDAZOLAM HYDROCHLORIDE 2 MG/2ML
INJECTION, SOLUTION INTRAMUSCULAR; INTRAVENOUS AS NEEDED
Status: DISCONTINUED | OUTPATIENT
Start: 2024-02-22 | End: 2024-02-22

## 2024-02-22 RX ORDER — ONDANSETRON 4 MG/1
4 TABLET, ORALLY DISINTEGRATING ORAL EVERY 8 HOURS PRN
Qty: 15 TABLET | Refills: 0 | Status: SHIPPED | OUTPATIENT
Start: 2024-02-22 | End: 2024-02-22

## 2024-02-22 RX ORDER — ASCORBIC ACID 500 MG
500 TABLET ORAL 2 TIMES DAILY
Status: CANCELLED | OUTPATIENT
Start: 2024-02-22

## 2024-02-22 RX ORDER — OXYCODONE HYDROCHLORIDE 5 MG/1
5 TABLET ORAL EVERY 4 HOURS PRN
Status: DISCONTINUED | OUTPATIENT
Start: 2024-02-22 | End: 2024-02-22 | Stop reason: HOSPADM

## 2024-02-22 RX ORDER — FERROUS SULFATE 325(65) MG
325 TABLET ORAL 2 TIMES DAILY WITH MEALS
Status: CANCELLED | OUTPATIENT
Start: 2024-02-22

## 2024-02-22 RX ORDER — FENTANYL CITRATE 50 UG/ML
INJECTION, SOLUTION INTRAMUSCULAR; INTRAVENOUS AS NEEDED
Status: DISCONTINUED | OUTPATIENT
Start: 2024-02-22 | End: 2024-02-22

## 2024-02-22 RX ORDER — FENTANYL CITRATE/PF 50 MCG/ML
25 SYRINGE (ML) INJECTION
Status: DISCONTINUED | OUTPATIENT
Start: 2024-02-22 | End: 2024-02-22 | Stop reason: HOSPADM

## 2024-02-22 RX ORDER — KETAMINE HCL IN NACL, ISO-OSM 100MG/10ML
SYRINGE (ML) INJECTION AS NEEDED
Status: DISCONTINUED | OUTPATIENT
Start: 2024-02-22 | End: 2024-02-22

## 2024-02-22 RX ORDER — BUPIVACAINE HYDROCHLORIDE 7.5 MG/ML
INJECTION, SOLUTION INTRASPINAL AS NEEDED
Status: DISCONTINUED | OUTPATIENT
Start: 2024-02-22 | End: 2024-02-22

## 2024-02-22 RX ORDER — ASPIRIN 81 MG/1
81 TABLET ORAL 2 TIMES DAILY
Qty: 60 TABLET | Refills: 0 | Status: SHIPPED | OUTPATIENT
Start: 2024-02-22 | End: 2024-03-23

## 2024-02-22 RX ORDER — PROPOFOL 10 MG/ML
INJECTION, EMULSION INTRAVENOUS AS NEEDED
Status: DISCONTINUED | OUTPATIENT
Start: 2024-02-22 | End: 2024-02-22

## 2024-02-22 RX ORDER — ACETAMINOPHEN 325 MG/1
650 TABLET ORAL EVERY 4 HOURS PRN
Status: CANCELLED | OUTPATIENT
Start: 2024-02-22

## 2024-02-22 RX ORDER — CEFAZOLIN SODIUM 2 G/50ML
2000 SOLUTION INTRAVENOUS ONCE
Status: COMPLETED | OUTPATIENT
Start: 2024-02-22 | End: 2024-02-22

## 2024-02-22 RX ORDER — GLYCOPYRROLATE 0.2 MG/ML
INJECTION INTRAMUSCULAR; INTRAVENOUS AS NEEDED
Status: DISCONTINUED | OUTPATIENT
Start: 2024-02-22 | End: 2024-02-22

## 2024-02-22 RX ORDER — MAGNESIUM HYDROXIDE 1200 MG/15ML
LIQUID ORAL AS NEEDED
Status: DISCONTINUED | OUTPATIENT
Start: 2024-02-22 | End: 2024-02-22 | Stop reason: HOSPADM

## 2024-02-22 RX ORDER — SODIUM CHLORIDE, SODIUM LACTATE, POTASSIUM CHLORIDE, CALCIUM CHLORIDE 600; 310; 30; 20 MG/100ML; MG/100ML; MG/100ML; MG/100ML
75 INJECTION, SOLUTION INTRAVENOUS CONTINUOUS
Status: CANCELLED | OUTPATIENT
Start: 2024-02-22

## 2024-02-22 RX ORDER — OXYCODONE HYDROCHLORIDE 5 MG/1
5 TABLET ORAL EVERY 4 HOURS PRN
Qty: 45 TABLET | Refills: 0 | Status: SHIPPED | OUTPATIENT
Start: 2024-02-22 | End: 2024-03-03

## 2024-02-22 RX ORDER — FOLIC ACID 1 MG/1
1 TABLET ORAL DAILY
Status: CANCELLED | OUTPATIENT
Start: 2024-02-22

## 2024-02-22 RX ORDER — CALCIUM CARBONATE 500 MG/1
1000 TABLET, CHEWABLE ORAL DAILY PRN
Status: CANCELLED | OUTPATIENT
Start: 2024-02-22

## 2024-02-22 RX ORDER — BUPIVACAINE HYDROCHLORIDE 5 MG/ML
INJECTION, SOLUTION EPIDURAL; INTRACAUDAL AS NEEDED
Status: DISCONTINUED | OUTPATIENT
Start: 2024-02-22 | End: 2024-02-22

## 2024-02-22 RX ORDER — DOCUSATE SODIUM 100 MG/1
100 CAPSULE, LIQUID FILLED ORAL 2 TIMES DAILY
Status: CANCELLED | OUTPATIENT
Start: 2024-02-22

## 2024-02-22 RX ORDER — PROPOFOL 10 MG/ML
INJECTION, EMULSION INTRAVENOUS CONTINUOUS PRN
Status: DISCONTINUED | OUTPATIENT
Start: 2024-02-22 | End: 2024-02-22

## 2024-02-22 RX ORDER — ONDANSETRON 4 MG/1
4 TABLET, ORALLY DISINTEGRATING ORAL EVERY 8 HOURS PRN
Qty: 15 TABLET | Refills: 0 | Status: SHIPPED | OUTPATIENT
Start: 2024-02-22

## 2024-02-22 RX ADMIN — PROPOFOL 50 MCG/KG/MIN: 10 INJECTION, EMULSION INTRAVENOUS at 11:24

## 2024-02-22 RX ADMIN — CHLORHEXIDINE GLUCONATE 15 ML: 1.2 RINSE ORAL at 09:59

## 2024-02-22 RX ADMIN — FENTANYL CITRATE 100 MCG: 50 INJECTION INTRAMUSCULAR; INTRAVENOUS at 10:57

## 2024-02-22 RX ADMIN — Medication 10 MG: at 11:34

## 2024-02-22 RX ADMIN — GLYCOPYRROLATE 0.2 MG: 0.2 INJECTION, SOLUTION INTRAMUSCULAR; INTRAVENOUS at 11:14

## 2024-02-22 RX ADMIN — ONDANSETRON 4 MG: 2 INJECTION INTRAMUSCULAR; INTRAVENOUS at 11:18

## 2024-02-22 RX ADMIN — CEFAZOLIN SODIUM 2000 MG: 2 SOLUTION INTRAVENOUS at 11:27

## 2024-02-22 RX ADMIN — Medication 10 MG: at 11:40

## 2024-02-22 RX ADMIN — SODIUM CHLORIDE, SODIUM LACTATE, POTASSIUM CHLORIDE, AND CALCIUM CHLORIDE 125 ML/HR: .6; .31; .03; .02 INJECTION, SOLUTION INTRAVENOUS at 10:04

## 2024-02-22 RX ADMIN — BUPIVACAINE HYDROCHLORIDE IN DEXTROSE 1.6 ML: 7.5 INJECTION, SOLUTION SUBARACHNOID at 11:21

## 2024-02-22 RX ADMIN — PROPOFOL 20 MG: 10 INJECTION, EMULSION INTRAVENOUS at 12:45

## 2024-02-22 RX ADMIN — SODIUM CHLORIDE, SODIUM LACTATE, POTASSIUM CHLORIDE, AND CALCIUM CHLORIDE: .6; .31; .03; .02 INJECTION, SOLUTION INTRAVENOUS at 11:27

## 2024-02-22 RX ADMIN — Medication 20 MG: at 11:30

## 2024-02-22 RX ADMIN — MIDAZOLAM 2 MG: 1 INJECTION INTRAMUSCULAR; INTRAVENOUS at 11:20

## 2024-02-22 RX ADMIN — BUPIVACAINE HYDROCHLORIDE 10 ML: 5 INJECTION, SOLUTION EPIDURAL; INTRACAUDAL; PERINEURAL at 11:08

## 2024-02-22 RX ADMIN — BUPIVACAINE 10 ML: 13.3 INJECTION, SUSPENSION, LIPOSOMAL INFILTRATION at 11:08

## 2024-02-22 RX ADMIN — MIDAZOLAM 2 MG: 1 INJECTION INTRAMUSCULAR; INTRAVENOUS at 10:57

## 2024-02-22 RX ADMIN — OXYCODONE HYDROCHLORIDE 5 MG: 5 TABLET ORAL at 14:24

## 2024-02-22 RX ADMIN — TRANEXAMIC ACID 1000 MG: 10 INJECTION, SOLUTION INTRAVENOUS at 11:27

## 2024-02-22 RX ADMIN — SODIUM CHLORIDE, SODIUM LACTATE, POTASSIUM CHLORIDE, AND CALCIUM CHLORIDE: .6; .31; .03; .02 INJECTION, SOLUTION INTRAVENOUS at 12:38

## 2024-02-22 NOTE — DISCHARGE INSTR - AVS FIRST PAGE
POSTOPERATIVE INSTRUCTIONS following KNEE SURGERY    MEDICATIONS:  Resume all home medications unless otherwise instructed by your surgeon.  Pain Medication:  Oxycodone 5 mg, 1-3 tablets every 3 hours as needed  If you were given a regional anesthetic (nerve block), please begin taking the pain medication as soon as you get home, even if you have minimal or no pain.  DO NOT WAIT FOR THE NERVE BLOCK TO WEAR OFF.  Possible side effects include nausea, constipation, and urinary retention.  If you experience these side effects, please call our office for assistance.  Pain med refills are authorized only during office hours (8am-4pm, Mon-Fri).  Antibiotic: Keflex 500 mg take 4 pills at 8 PM tonight and 4 pills at 8 AM tomorrow morning.  Nausea Medication:  Zofran ODT 4 mg, 1 tablet every 6 hours as needed  Fill prescription ONLY if you expericnce severe nausea.  Blood Clot Prevention:  Aspirin 81 mg, 1 tablet twice daily for 30 days  Pump your foot up and down 20 times per hour while you are less mobile.    WOUND CARE:  Keep the dressing clean and dry.  Light drainage may occur the first 2 days postop.  Next dressing and RAÚL stockings for 7 days, then you can remove the Mepilex dressing and just use the RAÚL stockings until seen in the office.  Please call our office ([phone]) if you experience either of the following:  Sudden increase in swelling, redness, or warmth at the surgical site  Excessive incisional drainage that persists beyond the 3rd day after surgery  Oral temperature greater than 101 degrees, not relieved with Tylenol  Shortness of breath, chest pain, nausea, or any other concerning symptoms    SWELLING CONTROL:  Cold Therapy:  The cold therapy device may be used either continuously or only as needed, according to your preference.  Do not let the pad directly touch your skin.  Alternatively, apply ice (20 min on, 20 min off) as often as you feel is necessary.  Elevation:  Elevate the entire leg above  "heart level.  Place pillows under your ankle to keep your knee straight.  Compression:  Apply ACE wraps or a thigh-length compression stocking as needed.    RANGE OF MOTION:  You are allowed FULL RANGE OF MOTION as tolerated.    IMMOBILIZATION:  None.  You are allowed full range of motion as tolerated.    ACTIVITY:   BEAR FULL WEIGHT AS TOLERATED on the operative leg.  Use crutches to assist only as needed.  Using Crutches on Stairs:  Going up, lead with your \"good\" (nonoperative) leg.  Going down, lead with your \"bad\" (operative) leg.  Use a hand rail when available.  Knee Extension:  Place a rolled towel or pillow under your ankle for 20-30 minutes 3-5 times per day.  This will help to maintain full knee extension.  Quad Sets:  Sit or lie with your knee straight.  Tighten your quadriceps (front thigh) muscle.  Hold for 3 seconds, then relax.  Repeat 20 times per hour while awake.    PHYSICAL THERAPY:  Begin therapy on 5 to 7 days after surgery.  You were given a prescription for therapy at your preoperative office visit.  If you do not have physical therapy scheduled yet, please call our office for assistance.    FOLLOW-UP APPOINTMENT:  2 weeks after surgery with:    Dr. Migue Martin MD  [facility]  [address] [place] [address], [address], [place] [address]  [phone] ([place] Office)  [phone] (After-Hours)   "

## 2024-02-22 NOTE — OP NOTE
OPERATIVE REPORT    PATIENT NAME: Gurdeep Martin   :  1965  MRN: [id number]  Pt Location: [facility] OR ROOM 02    SURGERY DATE: 2024    SURGEON(S) and ROLE:  Primary: Migue Martin MD  Assisting: Fransisco Colby PA-C    NOTE:  The presence of a physician assistant was necessary to help with patient positioning, surgical exposure, wound retraction, wound closure, and other key portions of the procedure.  No qualified resident was available for this case.      PREOPERATIVE DIAGNOSES:  Right Knee Osteoarthritis    POSTOPERATIVE DIAGNOSES:  Same as Preoperative Diagnosis    PROCEDURES:  Right Total Knee Arthroplasty      ANESTHESIA TYPE:  Spinal    ANESTHESIA STAFF:   Anesthesiologist: Natalia Toribio MD  CRNA: Adiel Rosales CRNA    ESTIMATED BLOOD LOSS:  150 mL    TOURNIQUET TIME:  not used    PERIOPERATIVE ANTIBIOTICS:  cefazolin, 2 grams    IMPLANTS: Depuy Attune    Femur:  Size 6 PS cementless    Tibia:  Size 6 fixed bearing Affixium cementless    Patella: 38 mm oval dome    Poly : 5 mm      Implant Name Type Inv. Item Serial No.  Lot No. LRB No. Used Action   DEPUY BONE CEMENT CMW2 - [id number]  DEPUY BONE CEMENT CMW2  DEPUY [id number] Right 1 Implanted   COMPONENT FEM SZ 6 RT CMNTLS POR PS ATTUNE - [id number]  COMPONENT FEM SZ 6 RT CMNTLS POR PS ATTUNE  DEPUY [id number] Right 1 Implanted   INSERT TIBIAL 5MM SZ 6 FB PS ATTUNE - [id number]  INSERT TIBIAL 5MM SZ 6 FB PS ATTUNE  DEPUY [id number] Right 1 Implanted   BASEPLATE TIBIAL SZ 6 FX BRNG  ATTUNE - [id number]  BASEPLATE TIBIAL SZ 6 FX BRNG  ATTUNE  DEPUY [id number] Right 1 Implanted   COMPONENT PATELLA 38MM MEDIAL DOME ATTUNE - [id number]  COMPONENT PATELLA 38MM MEDIAL DOME ATTUNE  DEPUY [id number] Right 1 Implanted       SPECIMENS:  * No specimens in log *    DRAINS:  None      OPERATIVE INDICATIONS:  The patient is a 59 y.o. male with right knee pain and severe osteoarthritis.  Surgical treatment was indicated due to persistent  symptoms despite non-surgical treatment.  After a thorough discussion of the potential risks, benefits, and alternative treatments, the patient agreed to proceed with surgery.  The patient understands that the risks of surgery include, but are not limited to: infection, neurovascular injury, wound healing complications, venous thromboembolism, persistent pain, stiffness, instability, recurrence of symptoms, potential need for additional surgeries, and loss of limb or life.  Oral and written consent for surgery was obtained from the patient preoperatively.    PROCEDURE AND TECHNIQUE:  On the day of surgery, the patient was identified in the preoperative holding area.  The operative site was marked by the surgeon.  The patient was taken into the operating room.  A time-out was conducted to confirm the patient's identity, the operative site, and the proposed procedure.  The patient was anesthetized, and perioperative antibiotics were administered.  The patient was positioned supine on the OR table.  All bony prominences were padded.  A tourniquet was not used.  The operative site was prepped and draped using standard sterile technique.      An anterior midline incision was carried sharply to the extensor mechanism.  Hemostasis was obtained with electrocautery.  A medial parapatellar arthrotomy was made, and the patella was subluxated laterally.  Severe tri-compartmental degenerative changes were noted.  The infrapatellar fat pad, anterior horns of the menisci, cruciate ligaments, and synovium over the anterior femur were excised.  The lateral patellofemoral ligament was divided.  A lateral retinacular release was not performed.  The medial soft tissue sleeve was elevated from the tibia to the posteromedial corner.    Medial and lateral Z-retractors were placed on the tibia.  A Javed retractor placed in the intercondylar notch was used to subluxate the tibia anteriorly.  The extramedullary tibial guide was used to  position the tibial cutting block perpendicular to the tibial mechanical axis.  The tibial cut was made with a 3 degree posterior slope, removing 2 mm from the medial plateau and 7 mm from the lateral plateau.    The femoral canal was accessed with a reamer, and the intramedullary alignment vipul was placed.  The distal femoral cut was made in 6 degrees of valgus, removing 10 mm of bone.  A size 6 femoral cutting guide was placed.  Rotational alignment was referenced using the transepicondylar axis and Yancey's line.  The anterior, posterior, and chamfer cuts were made.  The extension and flexion gaps were balanced to accept a 5 mm insert.  The box cuts were made using the appropriate sized cutting guide.  A posterior capsular release was performed with  A Jain elevator.  Meniscus remnant and posterior osteophytes were removed from the posterior recess.  Hemostasis was obtained with electrocautery.    Trial components were placed.  The trial liner was adjusted as necessary to provide appropriate soft tissue tension and knee range of motion.  The knee demonstrated excellent range of motion from full extension to 120 degrees of flexion and appropriated varus / valgus stability in full extension and mid-flexion.  The patella was cut freehand, sized, and prepared to accept the patellar component.  A patellar trial was placed.  Patellar tracking was stable using the no-thumbs method.  Tibial component rotation was marked with electocautery.    The trial components were removed.  The tibia was exposed, sized, and prepared with the reamer and keel-punch.  Cement was mixed on the back table while the knee was irrigated with sterile saline, and the femoral canal was plugged with autograft bone.  The bone cuts were dried, and the tibial, femoral and patellar components were placed.  Compression was applied to the patellar components while the cement cured.  Excess cement was removed.  The polyethylene liner was placed.   Hemostasis was obtained with electrocautery.  The knee was again irrigated with sterile saline.  A Hemovac drain was not placed.  The arthrotomy and the deep fasica were closed with #1 Vicryl sutures.  The skin was closed with 2-0 monocryl and 2-0 stratafix.  A sterile dressing was applied, and the drapes were removed.  The patient was awakened from anesthesia and taken to the PACU in stable condition.      COMPLICATIONS:  None    PATIENT DISPOSITION:  PACU       SIGNATURE:  Migue Martin MD  DATE:  February 22, 2024  TIME:  2:08 PM

## 2024-02-22 NOTE — ANESTHESIA POSTPROCEDURE EVALUATION
"Post-Op Assessment Note    CV Status:  Stable    Pain management: adequate       Mental Status:  Alert and awake   Hydration Status:  Euvolemic   PONV Controlled:  Controlled   Airway Patency:  Patent     Post Op Vitals Reviewed: Yes    No anethesia notable event occurred.    Staff: Anesthesiologist               /74 (02/22/24 1339)    Temp      Pulse (!) 52 (02/22/24 1339)   Resp 16 (02/22/24 1339)    SpO2 99 % (02/22/24 1339)    /74   Pulse (!) 52   Temp (!) 97.2 °F (36.2 °C)   Resp 16   Ht 5' 8\" (1.727 m)   Wt 99 kg (218 lb 4.1 oz)   SpO2 99%   BMI 33.19 kg/m²     "

## 2024-02-22 NOTE — OCCUPATIONAL THERAPY NOTE
Occupational Therapy Evaluation     Patient Name: Gurdeep Martin  Today's Date: 2/22/2024  Problem List  Principal Problem:    Primary osteoarthritis of right knee    Past Medical History  Past Medical History:   Diagnosis Date    Arthritis     Gout     Hyperlipidemia     Hypertension      Past Surgical History  Past Surgical History:   Procedure Laterality Date    CARPAL TUNNEL RELEASE Bilateral     COLONOSCOPY      NEUROMA EXCISION Left     Foot           02/22/24 1450   OT Last Visit   OT Visit Date 02/22/24   Note Type   Note type Evaluation   Additional Comments Pt greeted supine in bed, agreeable to OT evaluation.   Pain Assessment   Pain Assessment Tool 0-10   Pain Score 6   Pain Location/Orientation Orientation: Right;Location: Knee   Restrictions/Precautions   Weight Bearing Precautions Per Order Yes   RLE Weight Bearing Per Order WBAT   Other Precautions Telemetry;Fall Risk;Pain   Home Living   Type of Home House   Home Layout Two level;Stairs to enter with rails;Performs ADLs on one level;Able to live on main level with bedroom/bathroom  (5 TIM with bilateral handrails, pt has 1st floor setup)   Bathroom Shower/Tub Walk-in shower   Bathroom Toilet Raised   Bathroom Equipment Grab bars in shower;Grab bars around toilet   Home Equipment Walker   Prior Function   Level of [place] Independent with ADLs;Independent with functional mobility;Independent with IADLS   Lives With Spouse   Receives Help From Family   IADLs Independent with driving;Independent with meal prep;Independent with medication management   Falls in the last 6 months 0   Vocational Full time employment  (videoing)   Comments (+)    Lifestyle   Autonomy independent with all ADLs/IADLs, no AD use at baseline   Reciprocal Relationships Spouse   Service to Others FTE   Intrinsic Gratification biking, and walking   ADL   Where Assessed Edge of bed   Eating Assistance 6  Modified independent   Grooming Assistance 5   Supervision/Setup   UB Bathing Assistance 5  Supervision/Setup   LB Bathing Assistance 5  Supervision/Setup   UB Dressing Assistance 5  Supervision/Setup   LB Dressing Assistance 5  Supervision/Setup   Toileting Assistance  5  Supervision/Setup   Bed Mobility   Supine to Sit 6  Modified independent   Additional items HOB elevated;Verbal cues   Sit to Supine Unable to assess   Additional Comments Pt ended session in recliner.   Transfers   Sit to Stand 5  Supervision   Additional items Increased time required;Verbal cues   Stand to Sit 5  Supervision   Additional items Armrests;Increased time required;Verbal cues   Additional Comments cues for hand placement   Functional Mobility   Functional Mobility 5  Supervision   Additional Comments supervision, functional household distances with RW   Additional items Rolling walker   Balance   Static Sitting Good   Dynamic Sitting Fair +   Static Standing Fair   Dynamic Standing Fair -   Ambulatory Fair -   Activity Tolerance   Activity Tolerance Patient tolerated treatment well   Medical Staff Made Aware PT Pratik, Pt seen for co-evaluation with skilled Physical Therapy due to clinically unstable presentation , medical complexity, fall risk, functional balance, limited activity tolerance whish is a decline from PLOF and may impact overall safety.   Nurse Made Aware CHAPIN THOMAS Assessment   RUE Assessment WFL   LUE Assessment   LUE Assessment WFL   Hand Function   Gross Motor Coordination Functional   Fine Motor Coordination Functional   Cognition   Overall Cognitive Status WFL   Arousal/Participation Alert;Cooperative   Attention Within functional limits   Orientation Level Oriented X4   Memory Within functional limits   Following Commands Follows one step commands without difficulty   Comments Pleasant and cooperative   Assessment   Limitation Decreased ADL status;Decreased endurance;Decreased self-care trans;Decreased high-level ADLs   Prognosis Good   Assessment Pt is a  59 y.o. male seen for OT evaluation s/p adm to [facility] on 2/22/2024 w/ Primary osteoarthritis of right knee . Comorbidities affecting pt’s functional performance include a significant PMH of osteoarthritis, HTN, hyperlipidemia. Pt with active OT orders and activity orders for Up with assistance. Pt lives in a multilevel house with spouse and 5 TIM with bilateral handrails. Pt able to stay on main level with bedroom/bathroom. Pt has grab bars in walk-in shower and grab bars around raised toilet. (+) . At baseline, pt was independent with all ADLs/IADLs. Upon evaluation, pt currently requires supervision for UB ADLs, supervision for LB ADLs, supervision for toileting, mod I for bed mobility, supervision for functional mobility, and supervision for transfers 2* the following deficits impacting occupational performance: weakness, decreased strength , decreased balance, decreased activity tolerance, increased pain, and orthopedic restrictions. These impairments, as well at pt’s personal factors of: TIM home environment, steps within home environment, difficulty performing ADLs, difficulty performing IADLs, difficulty performing transfers/mobility, WBS, fall risk , and new use of AD for functional transfers/mobility limit pt’s ability to safely engage in all baseline areas of occupation. Based on the aforementioned OT evaluation, functional performance deficits, and assessments, pt has been identified as a moderate complexity evaluation. Pt to continue to benefit from continued acute OT services during hospital stay to address defined deficits and to maximize level of functional independence in the following Occupational Performance areas: grooming, bathing/shower, toilet hygiene, dressing, health maintenance, functional mobility, community mobility, household maintenance, and job performance/volunteering. From OT standpoint, recommend minimum resource intensity (OPPT) upon D/C. OT will continue to  follow pt 3-5x/wk.   Goals   STG Time Frame 3-5   Short Term Goal #1 Pt will improve activity tolerance to G for min 30 min treatment sessions for increase engagement in functional tasks   Short Term Goal #2 Pt will complete bed mobility at a mod I level w/ G balance/safety demonstrated to decrease caregiver assistance required   Short Term Goal  Pt will complete LB dressing/self care w/ mod I using adaptive device and DME as needed   LTG Time Frame 10-14   Long Term Goal #1 Pt will complete toileting w/ mod I w/ G hygiene/thoroughness using DME as needed   Long Term Goal #2 Pt will improve functional transfers to mod I on/off all surfaces using DME as needed w/ G balance/safety   Long Term Goal Pt will improve functional mobility during ADL/IADL/leisure tasks to mod I using DME as needed w/ G balance/safety   Plan   Treatment Interventions ADL retraining;Functional transfer training;Endurance training;Patient/family training;Equipment evaluation/education;Compensatory technique education;Continued evaluation;Energy conservation;Activityengagement   Goal Expiration Date 03/07/24   OT Treatment Day 0   OT Frequency 3-5x/wk   Discharge Recommendation   Rehab Resource Intensity Level, OT III (Minimum Resource Intensity)  (OPPT)   Additional Comments  The patient's raw score on the AM-PAC Daily Activity Inpatient Short Form is 20  . A raw score of greater than or equal to 19 suggests the patient may benefit from discharge to home. Please refer to the recommendation of the Occupational Therapist for safe discharge planning.   AM-PAC Daily Activity Inpatient   Lower Body Dressing 3   Bathing 3   Toileting 3   Upper Body Dressing 3   Grooming 4   Eating 4   Daily Activity Raw Score 20   Daily Activity Standardized Score (Calc for Raw Score >=11) 42.03   AM-PAC Applied Cognition Inpatient   Following a Speech/Presentation 4   Understanding Ordinary Conversation 4   Taking Medications 4   Remembering Where Things Are Placed  or Put Away 4   Remembering List of 4-5 Errands 4   Taking Care of Complicated Tasks 4   Applied Cognition Raw Score 24   Applied Cognition Standardized Score 62.21   End of Consult   Education Provided Yes;Family or social support of family present for education by provider   Patient Position at End of Consult Bedside chair;All needs within reach   Nurse Communication Nurse aware of consult   End of Consult Comments Pt seated OOB in chair at end of session. Call bell and phone within reach. All needs met and pt reports no further questions for OT at this time.   Janell Marshall, OT

## 2024-02-22 NOTE — PHYSICAL THERAPY NOTE
PT EVALUATION    Pt. Name: Gurdeep Martin  Pt. Age: 59 y.o.  MRN: [id number]  LENGTH OF STAY: 0      Admitting Diagnoses:   Primary osteoarthritis of both knees [M17.0]    Past Medical History:   Diagnosis Date    Arthritis     Gout     Hyperlipidemia     Hypertension        Past Surgical History:   Procedure Laterality Date    CARPAL TUNNEL RELEASE Bilateral     COLONOSCOPY      NEUROMA EXCISION Left     Foot       Imaging Studies:  No orders to display         02/22/24 1511   PT Last Visit   PT Visit Date 02/22/24   Note Type   Note type Evaluation   Pain Assessment   Pain Score 6   Pain Location/Orientation Orientation: Right;Location: Knee   Hospital Pain Intervention(s) Medication (See MAR);Repositioned;Ambulation/increased activity;Emotional support;Rest   Restrictions/Precautions   Weight Bearing Precautions Per Order Yes   RLE Weight Bearing Per Order WBAT   Other Precautions Telemetry;Fall Risk;Pain   Home Living   Type of Home House   Home Layout Two level;Stairs to enter with rails;Performs ADLs on one level;Able to live on main level with bedroom/bathroom;Other (Comment)  (pt has 1st floor bedroom/bathroom; 5STE w/ B/L HR)   Bathroom Shower/Tub Walk-in shower   Bathroom Toilet Raised   Bathroom Equipment Grab bars in shower;Grab bars around toilet   Home Equipment Walker   Prior Function   Level of [place] Independent with functional mobility;Independent with ADLs  (w/o AD)   Lives With Spouse   Receives Help From Family   Falls in the last 6 months 0   Vocational Full time employment   Comments (+)    General   Family/Caregiver Present Yes  (wife)   Cognition   Overall Cognitive Status WFL   Arousal/Participation Alert   Orientation Level Oriented X4   Following Commands Follows all commands and directions without difficulty   Comments pleasant & cooperative   Subjective   Subjective Pt agreeable to PT/OT evals.   RUE Assessment   RUE Assessment   (refer to OT)   LUE Assessment    LUE Assessment   (refer to OT)   RLE Assessment   RLE Assessment X  (3-/5 grossly)   LLE Assessment   LLE Assessment WFL  (4/5 grossly)   Coordination   Movements are Fluid and Coordinated 1   Sensation WFL   Bed Mobility   Supine to Sit 6  Modified independent   Additional items HOB elevated   Transfers   Sit to Stand 5  Supervision   Additional items Increased time required;Verbal cues   Stand to Sit 5  Supervision   Additional items Armrests;Increased time required;Verbal cues   Additional Comments cues for hand placement   Ambulation/Elevation   Gait pattern Antalgic;Decreased foot clearance;Excessively slow;Step to   Gait Assistance 5  Supervision   Additional items Verbal cues   Assistive Device Rolling walker   Distance 150'x1 + chair follow   Stair Management Assistance 5  Supervision   Additional items Verbal cues;Increased time required   Stair Management Technique Two rails;Step to pattern;Foreward;Nonreciprocal   Number of Stairs 5  (x2;  stairs)   Ambulation/Elevation Additional Comments no gross LOB noted   Balance   Static Sitting Good   Dynamic Sitting Fair +   Static Standing Fair  (w/ RW)   Dynamic Standing Fair -  (w/ RW)   Ambulatory Fair -  (w/ RW)   Endurance Deficit   Endurance Deficit No   Activity Tolerance   Activity Tolerance Patient tolerated treatment well   Medical Staff Made Aware OTR Janell   Nurse Made Aware yes, Caty   Assessment   Prognosis Good   Problem List Decreased strength;Decreased range of motion;Decreased endurance;Impaired balance;Decreased mobility;Pain   Assessment Pt 59 y.o. male s/p R TKR 2/22/24 2* to Primary osteoarthritis of right knee. Pt referred to PT for mobility assessment & D/C planning. Please see flowsheet above re: home set-up, PLOF & objective findings during PT assessment. PTA, pt reports being I w/o AD.  On eval, pt require modified I w/ bed mobility & S for transfers & amb w/ RW as well as stair management + initial cues for techniques &  safety. No SOB & dizziness reported t/o session. BP during session as follows: BP supine 124/74; BP standing 133/81. Pt states no concerns about going back home when medically cleared. The patient's AM-PAC Basic Mobility Inpatient Short Form Raw Score is 20. A Raw score of greater than 16 suggests the patient may benefit from discharge to home. Please also refer to the recommendation of the Physical Therapist for safe discharge planning. From PT standpoint, pt may return home when medically cleared. Will recommend level III (minimum rehab resource intensity) at D/C. Will D/C PT. Nsg notified. Co-eval was necessary to complete this PT eval for the pt's best interest given pt's medical acuity & complexity.   Barriers to Discharge None   Goals   Patient Goals to go home today   PT Treatment Day 0   Plan   Treatment/Interventions Spoke to nursing;OT;Family  (PT eval only)   PT Frequency Other (Comment)  (D/C PT)   Discharge Recommendation   Rehab Resource Intensity Level, PT III (Minimum Resource Intensity)   Equipment Recommended Walker  (pt has)   Additional Comments restorative for daily amb   AM-PAC Basic Mobility Inpatient   Turning in Flat Bed Without Bedrails 4   Lying on Back to Sitting on Edge of Flat Bed Without Bedrails 4   Moving Bed to Chair 3   Standing Up From Chair Using Arms 3   Walk in Room 3   Climb 3-5 Stairs With Railing 3   Basic Mobility Inpatient Raw Score 20   Basic Mobility Standardized Score 43.99   Highest Level Of Mobility   JH-HLM Goal 6: Walk 10 steps or more   JH-HLM Achieved 7: Walk 25 feet or more   End of Consult   Patient Position at End of Consult Bedside chair;All needs within reach   End of Consult Comments Pt in stable condition. All needs in reach.   Hx/personal factors: co-morbidities, inaccessible home, dec caregiver support, telemetry, use of AD, pain, fall risk, and assist w/ ADL's  Examination: dec mobility, dec balance, dec endurance, dec amb, risk for falls,  pain  Clinical: unpredictable (ongoing medical status, risk for falls, and pain mgt)  Complexity: high    Pratik Casiano

## 2024-02-22 NOTE — PLAN OF CARE
Problem: OCCUPATIONAL THERAPY ADULT  Goal: Performs self-care activities at highest level of function for planned discharge setting.  See evaluation for individualized goals.  Description: Treatment Interventions: ADL retraining, Functional transfer training, Endurance training, Patient/family training, Equipment evaluation/education, Compensatory technique education, Continued evaluation, Energy conservation, Activityengagement          See flowsheet documentation for full assessment, interventions and recommendations.   Outcome: Adequate for Discharge  Note: Limitation: Decreased ADL status, Decreased endurance, Decreased self-care trans, Decreased high-level ADLs  Prognosis: Good  Assessment: Pt is a 59 y.o. male seen for OT evaluation s/p adm to [facility] on 2/22/2024 w/ Primary osteoarthritis of right knee . Comorbidities affecting pt’s functional performance include a significant PMH of osteoarthritis, HTN, hyperlipidemia. Pt with active OT orders and activity orders for Up with assistance. Pt lives in a multilevel house with spouse and 5 TIM with bilateral handrails. Pt able to stay on main level with bedroom/bathroom. Pt has grab bars in walk-in shower and grab bars around raised toilet. (+) . At baseline, pt was independent with all ADLs/IADLs. Upon evaluation, pt currently requires supervision for UB ADLs, supervision for LB ADLs, supervision for toileting, mod I for bed mobility, supervision for functional mobility, and supervision for transfers 2* the following deficits impacting occupational performance: weakness, decreased strength , decreased balance, decreased activity tolerance, increased pain, and orthopedic restrictions. These impairments, as well at pt’s personal factors of: TIM home environment, steps within home environment, difficulty performing ADLs, difficulty performing IADLs, difficulty performing transfers/mobility, WBS, fall risk , and new use of AD for functional  transfers/mobility limit pt’s ability to safely engage in all baseline areas of occupation. Based on the aforementioned OT evaluation, functional performance deficits, and assessments, pt has been identified as a moderate complexity evaluation. Pt to continue to benefit from continued acute OT services during hospital stay to address defined deficits and to maximize level of functional independence in the following Occupational Performance areas: grooming, bathing/shower, toilet hygiene, dressing, health maintenance, functional mobility, community mobility, household maintenance, and job performance/volunteering. From OT standpoint, recommend minimum resource intensity (OPPT) upon D/C. OT will continue to follow pt 3-5x/wk.     Rehab Resource Intensity Level, OT: III (Minimum Resource Intensity) (OPPT)

## 2024-02-22 NOTE — ANESTHESIA PREPROCEDURE EVALUATION
Procedure:  ARTHROPLASTY KNEE TOTAL, same day d/c (Right: Knee)    Relevant Problems   CARDIO   (+) Benign essential hypertension   (+) Mixed hyperlipidemia      MUSCULOSKELETAL   (+) Primary osteoarthritis of both knees   (+) Primary osteoarthritis of one hip, left   (+) Primary osteoarthritis of right knee        Physical Exam    Airway    Mallampati score: I  TM Distance: >3 FB  Neck ROM: full     Dental   No notable dental hx     Cardiovascular  Rhythm: regular, Rate: normal, Cardiovascular exam normal    Pulmonary  Pulmonary exam normal Breath sounds clear to auscultation    Other Findings        Anesthesia Plan  ASA Score- 2     Anesthesia Type- spinal with ASA Monitors.         Additional Monitors:     Airway Plan:     Comment: Adductor canal block also discussed.       Plan Factors-Exercise tolerance (METS): >4 METS.    Chart reviewed.    Patient summary reviewed.    Patient is not a current smoker.  Patient did not smoke on day of surgery.    Obstructive sleep apnea risk education given perioperatively.        Induction-     Postoperative Plan-     Informed Consent- Anesthetic plan and risks discussed with patient.

## 2024-02-23 ENCOUNTER — TELEPHONE (OUTPATIENT)
Dept: OBGYN CLINIC | Facility: HOSPITAL | Age: 59
End: 2024-02-23

## 2024-02-23 NOTE — TELEPHONE ENCOUNTER
Caller: Patient     Doctor: Mario    Reason for call: Patient returning nurse navigator's call please call pt back     Call back#: [phone]

## 2024-02-26 ENCOUNTER — EVALUATION (OUTPATIENT)
Dept: PHYSICAL THERAPY | Facility: CLINIC | Age: 59
End: 2024-02-26
Payer: COMMERCIAL

## 2024-02-26 DIAGNOSIS — M25.561 RECURRENT PAIN OF RIGHT KNEE: Primary | ICD-10-CM

## 2024-02-26 DIAGNOSIS — R26.9 GAIT ABNORMALITY: ICD-10-CM

## 2024-02-26 DIAGNOSIS — Z96.651 TOTAL KNEE REPLACEMENT STATUS, RIGHT: ICD-10-CM

## 2024-02-26 PROCEDURE — 97110 THERAPEUTIC EXERCISES: CPT | Performed by: PHYSICAL MEDICINE & REHABILITATION

## 2024-02-26 PROCEDURE — 97161 PT EVAL LOW COMPLEX 20 MIN: CPT | Performed by: PHYSICAL MEDICINE & REHABILITATION

## 2024-02-26 NOTE — PROGRESS NOTES
PT Evaluation     Today's date: 2024  Patient name: Gurdeep Martin  : 1965  MRN: [id number]  Referring provider: Migue Martin, *  Dx:   Encounter Diagnosis     ICD-10-CM    1. Recurrent pain of right knee  M25.561       2. Total knee replacement status, right  Z96.651       3. Gait abnormality  R26.9                      Assessment  Assessment details: Pt is a 60 yo M presenting to outpatient physical therapy with noted impairments including R knee swelling and pain, impaired soft tissue mobility/flexibility, reduced joint range of motion, reduced strength, reduced joint positional awareness, altered gait, and reduced activity tolerance. Signs and symptoms at present are consistent with referring diagnosis of R knee pain 2* arthritis. He is s/p R TKA 24 and presents to PT today for first OPPT evaluation/tx session. He has been doing exercises at home as well. Overall he is doing well thus far. Due to noted impairments, the patient's present functional limitations include difficulty with ADLs with increased need for assistance, reliance on medication and/or modalities for pain relief, reduced tolerance for functional mobility and activity, and difficulty completing home,work, and self care responsibilities. Increased time was spent initiating and reviewing post op HEP/exercise program. Also spent increased time educating pt on post operative expectations, post op pain control with CP and ROM/exercise, importance of elevation and controlling swelling, and importance of mobility and ROM of  R knee post op to tolerance. Pt's wife is a PTA and has been helping him with all these things as well. All pt questions/concerns were answered to his satisfaction. Patient to benefit from skilled outpatient physical therapy 1-2x/week for 10-12 weeks in order to reduce pain, maximize pain free range of motion, increase strength and stability, and improve functional mobility/functional activity in order  "to maximize return to prior level of function with reduced limitations. Home exercise program was provided and all questions answered to patient's level of satisfaction. Thank you for your referral.     Impairments: abnormal gait, abnormal muscle firing, abnormal muscle tone, abnormal or restricted ROM, abnormal movement, activity intolerance, impaired balance, impaired physical strength, lacks appropriate home exercise program, pain with function and weight-bearing intolerance    Symptom irritability: moderateUnderstanding of Dx/Px/POC: good   Prognosis: good    Goals  Goals to be achieved post op:     STGs to be achieved in 4 weeks:  1. Pt to demonstrate reduced subjective pain rating \"at worst\" by at least 2-3 points from Initial Eval in order to allow for reduced pain with ADLs and improved functional activity tolerance.   2. Pt to demonstrate increased AROM of R knee flexion to at least 110 degrees and knee extension to no > -5*  in order to allow for greater ease and independence with ADLs and functional mobility.   3. Pt to demonstrate increased MMT of R knee grossly by at least 1/2-1 grade in order to improve safety and stability with ADLs and functional mobility.   4.Pt will demonstrate ability to safely ambulate at least 200ft with least restrictive AD with minimized gait deviations, independently, and without evidence of instability.      LTGs to be achieved in 10-12 weeks/By DC:  1. Pt will be I with HEP in order to continue to improve quality of life and independence and reduce risk for re-injury.   2. Pt to demonstrate return to PLOF and work without limitations or restrictions.   3. Pt to demonstrate improved function as noted by achieving or exceeding predicted score on FOTO outcomes assessment tool.         Plan  Patient would benefit from: skilled physical therapy  Planned modality interventions: cryotherapy and thermotherapy: hydrocollator packs  Planned therapy interventions: manual therapy, " motor coordination training, neuromuscular re-education, patient education, postural training, self care, strengthening, stretching, therapeutic activities, therapeutic exercise, gait training, home exercise program, functional ROM exercises and balance  Frequency: 2x week  Duration in visits: 12  Duration in weeks: 6  Plan of Care beginning date: 2024  Plan of Care expiration date: 3/8/2024  Treatment plan discussed with: patient and family        Subjective Evaluation    History of Present Illness  Mechanism of injury: Chronic hx of B knee pain and OA. R knee was frequently buckling; Worsening overtime. TKA recommended B.   Pt is s/p R TKA 24. D/c home same day. WBAT R LE with RW.   Pt is presently taking Oxycodone at night mostly. Feels his pain is well controlled at present. Notes however he was feeling better this morning than right now. NO fever, illness, vomiting etc. No recent knee instability R; L knee still hurts. Has been trying to do some ROM R knee; doing heel slides, QS, standing SLRs, and Hrs at home. More sore now than earlier today; no new sx. Dressing intact; to be in place x 7 days per d/c instruction. Swelling and stiffness noted R knee; keeps light compression sleeve on. RTD 3/8. L knee TKA upcoming.   Presently OOW. Needs to be able to walk and carry. Also wants to get back to hunting and bike riding. Wife is a PTA; has been helping him with ROM and exercises at home.   Quality of life: good    Patient Goals  Patient goals for therapy: decreased pain, decreased edema, increased motion, increased strength, independence with ADLs/IADLs, return to sport/leisure activities, improved balance and return to work    Pain  Current pain ratin  At best pain rating: 3  At worst pain ratin  Location: R knee  Quality: throbbing, tight, sharp, dull ache and pressure  Relieving factors: rest, support, medications, ice and relaxation  Aggravating factors: standing, walking and sitting (Bending  it, transitional movements)  Progression: improved    Social Support  Steps to enter house: yes  Stairs in house: yes (staying on first floor)   Lives in: multiple-level home  Lives with: spouse    Employment status: working  Treatments  Current treatment: physical therapy        Objective     Observations     Additional Observation Details  Post op bandage intact R Knee (in place x 7 days)  No present notable signs/sx of infection R knee/leg; will monitor  Moderate swelling R Knee into lower leg  No calf redness or tenderness R   Light compression sleeve on R knee/leg        Neurological Testing     Sensation     Knee   Left Knee   Intact: Light touch    Right Knee   Intact: light touch     Additional Neurological Details  Denies any present n/t R LE     Active Range of Motion   Left Knee   Flexion: WFL  Extension: WFL    Right Knee   Flexion: 93 (tight> pain) degrees   Extension: -9 (tight) degrees     Additional Active Range of Motion Details  Chronic L knee pain and instability per pt    R knee stiffness and pain at end ranges       Mobility   Patellar Mobility:   Left Knee   Hypomobile: left medial, left lateral, left superior and left inferior    Strength/Myotome Testing     Left Knee   Flexion: WFL  Extension: WFL    Right Knee   Flexion: 3-  Extension: 3-  Quadriceps contraction: poor    Additional Strength Details  Decreased R quad/VMO tone with QS  Poor patellar elevation on R with QS    R hip grossly 4--4/5    R ankle grossly 4/5    Ambulation     Ambulation: Level Surfaces   Ambulation with assistive device: independent  Ambulation without assistive device: independent    Additional Level Surfaces Ambulation Details  WBAT R LE with RW at present  Reduced R stance time  Reduced R knee flex with swing  Lacks TKE on R with stance   No evidence of instability with gait at IE with RW         Ambulation: Stairs   Ascend stairs: independent  Pattern: non-reciprocal  Railings: two rails  Descend stairs:  "independent  Pattern: non-reciprocal  Railings: two rails             Precautions: HTN, L knee pain , s/p R TKA 2/22      Re-eval Date:  3/8    Date 2/26/2024        Visit Count 1       FOTO 2/26/2024        Pain In See IE       Pain Out See IE           Manuals 2/26/2024        R knee pROM with gentle end range stretch as tolerated, R patellar mobs        R HS, Calf, Hip flexor/quad stretch                        Neuro Re-Ed        Romberg      Semi tandem      Tandem         Perturbation training                                                 Ther Ex        SRC vs Nustep AAROM R Knee       R heel slides             15x/5-10\"        R knee AAROM bottom step      Static R knee extension Reviewed seated AAROM EOB or chair     Supine roll under ankle   10x10\"        R QS      Calf stretch      HS stretch  10x/10\"        R hip flexor/quad stretch    HRs         Reviewed HEP        SLRs standing-->mat table      HS curls      LAQ Standing 3 way R- reviewed HEP    Reviewed HEP       Mini squats      Step ups                         Ther Activity pt education regarding pathophysiology/pathoanatomy of present pain/sx and condition, role of PT in improving pain/sx and function, pt education regarding activity modification to avoid exacerbation of sx and delayed recovery; ; review of stairs, gait, and transfer safety with AD post TKR; education regarding importance of maximizing gradual knee ROM post op to tolerance to facilitate progress and max LOF                        Gait Training Upcoming with RW--> no AD as able                        Modalities CP with elevation and knee ext after sessions - deferred to home today                             2/26/2024  - HEP was reviewed this date for above noted exercises. Pt demonstrated understanding without incident and without questions/concerns. Will continue to update upcoming.          "

## 2024-02-26 NOTE — TELEPHONE ENCOUNTER
"Patient contacted for a postoperative follow up assessment. Patient reports doing \"good so far.\" Patient states current pain level of a 4/10  when sitting and \"fine\"/10 when walking with RW.  Patient denies increase in swelling and dressing is clean, dry and intact. Patient is icing the site regularly.     Confirmed upcoming appointments with patient:   PT 2/26  Postop 3/8    We reviewed patients AVS medication list. Patient is taking Oxycodone 5mg PRN, ASA 81mg BID.  Pt did not  Zofran 4mg PRN. Pt confirmed he completed Keflex dosing. Patient has had a BM. Discussed postop constipation and starting OTC stool softener, pt is agreeable to start if needed. Pt is not using Tylenol PRN, discussed Tylenol max dosing 3000mg/24 hrs. Verbalizes understanding     Patient denies nausea, vomiting, abdominal pain, chest pain, shortness of breath, fever, dizziness and calf pain. Patient does not have any other questions or concerns at this time. Pt was encouraged to call with any questions, concerns or issues.    "

## 2024-03-06 ENCOUNTER — OFFICE VISIT (OUTPATIENT)
Dept: PHYSICAL THERAPY | Facility: CLINIC | Age: 59
End: 2024-03-06
Payer: COMMERCIAL

## 2024-03-06 DIAGNOSIS — M25.561 RECURRENT PAIN OF RIGHT KNEE: Primary | ICD-10-CM

## 2024-03-06 DIAGNOSIS — R26.9 GAIT ABNORMALITY: ICD-10-CM

## 2024-03-06 DIAGNOSIS — Z96.651 TOTAL KNEE REPLACEMENT STATUS, RIGHT: ICD-10-CM

## 2024-03-06 PROCEDURE — 97110 THERAPEUTIC EXERCISES: CPT

## 2024-03-06 PROCEDURE — 97140 MANUAL THERAPY 1/> REGIONS: CPT

## 2024-03-06 NOTE — PROGRESS NOTES
"Daily Note     Today's date: 3/6/2024  Patient name: Gurdeep Martin  : 1965  MRN: [id number]  Referring provider: Migue Martin, *  Dx:   Encounter Diagnosis     ICD-10-CM    1. Recurrent pain of right knee  M25.561       2. Total knee replacement status, right  Z96.651       3. Gait abnormality  R26.9           Start Time: 1510  Stop Time: 1600  Total time in clinic (min): 50 minutes    Subjective: \"I am doing my exercises at home.  I am walking and able to go on the bike.\"      Objective: See treatment diary below      Assessment: Tolerated treatment well. Able to complete program without incident.  Good motion noted with minimal end range pain. Able to complete supine SLR with AA to initiate movement, able to finish actively.  Will continue to monitor and progress as able.  Patient demonstrated fatigue post treatment and would benefit from continued PT      Plan: Continue per plan of care.  Progress treatment as tolerated.       Precautions: HTN, L knee pain , s/p R TKA       Re-eval Date:  3/8    Date 2024  3/6      Visit Count 1       FOTO 2024        Pain In See IE 1-2/10      Pain Out See IE 1-2/10          Manuals 2024  3/6      R knee pROM with gentle end range stretch as tolerated, R patellar mobs  R knee pROM with gentle end range stretch as tolerated, R patellar mobs 10'      R HS, Calf, Hip flexor/quad stretch          PROM 5-117              Neuro Re-Ed        Romberg      Semi tandem      Tandem         Perturbation training                                                 Ther Ex        SRC vs Nustep AAROM R Knee       R heel slides             15x/5-10\"  Nu-step L3 10'          20x/3-5\"      R knee AAROM bottom step      Static R knee extension Reviewed seated AAROM EOB or chair     Supine roll under ankle   10x10\"        R QS      Calf stretch      HS stretch  10x/10\"  20x/3-5\"      Incline 4x30\"      Strap 4x30\"      R hip flexor/quad stretch    HRs "         Reviewed HEP          20x      SLRs standing-->mat table      HS curls      LAQ Standing 3 way R- reviewed HEP    Reviewed HEP Supine   Flex AA 2x10  Abd 2x10              SAQ sm red roll 2x10      Mini squats      Step ups       Bottom step  2x10                      Ther Activity pt education regarding pathophysiology/pathoanatomy of present pain/sx and condition, role of PT in improving pain/sx and function, pt education regarding activity modification to avoid exacerbation of sx and delayed recovery; ; review of stairs, gait, and transfer safety with AD post TKR; education regarding importance of maximizing gradual knee ROM post op to tolerance to facilitate progress and max LOF                        Gait Training Upcoming with RW--> no AD as able                        Modalities CP with elevation and knee ext after sessions - deferred to home today                             2/26/2024  - HEP was reviewed this date for above noted exercises. Pt demonstrated understanding without incident and without questions/concerns. Will continue to update upcoming.

## 2024-03-08 ENCOUNTER — APPOINTMENT (OUTPATIENT)
Dept: RADIOLOGY | Facility: MEDICAL CENTER | Age: 59
End: 2024-03-08

## 2024-03-08 ENCOUNTER — OFFICE VISIT (OUTPATIENT)
Dept: OBGYN CLINIC | Facility: MEDICAL CENTER | Age: 59
End: 2024-03-08

## 2024-03-08 VITALS
WEIGHT: 218 LBS | DIASTOLIC BLOOD PRESSURE: 76 MMHG | BODY MASS INDEX: 33.04 KG/M2 | SYSTOLIC BLOOD PRESSURE: 126 MMHG | HEIGHT: 68 IN | HEART RATE: 59 BPM

## 2024-03-08 DIAGNOSIS — Z96.651 STATUS POST TOTAL RIGHT KNEE REPLACEMENT: ICD-10-CM

## 2024-03-08 DIAGNOSIS — Z96.651 STATUS POST TOTAL RIGHT KNEE REPLACEMENT: Primary | ICD-10-CM

## 2024-03-08 DIAGNOSIS — M17.11 PRIMARY OSTEOARTHRITIS OF RIGHT KNEE: ICD-10-CM

## 2024-03-08 PROCEDURE — 73562 X-RAY EXAM OF KNEE 3: CPT

## 2024-03-08 PROCEDURE — 99024 POSTOP FOLLOW-UP VISIT: CPT | Performed by: PHYSICIAN ASSISTANT

## 2024-03-08 NOTE — PROGRESS NOTES
"Orthopaedic Surgery - Office Note  Gurdeep Maritn (59 y.o. male)   : 1965   MRN: [id number]  Encounter Date: 3/8/2024    Chief Complaint   Patient presents with    Right Knee - Post-op       Assessment / Plan  Status post right total knee replacement on 2024    Continue PT as ordered.  Focus on strengthening and extension at this time.  Continue with ice and analgesics/NSAIDs as needed for pain.  Aspirin 81 mg twice daily for 30 days total for DVT prophylaxis.  We did discuss the need for dental antibiotic prophylaxis as a lifetime recommendation going forward.  He will contact the office if he needs anything for a dental visit.  We did obtain x-rays of the right knee at today's visit which showed the prosthesis in good position and no signs of loosening.  I did review these with the patient.  Return in about 6 weeks (around 2024) for follow up with Dr. Martin.    History of Present Illness  Gurdeep Martin is a 59 y.o. male who presents for follow-up status post right total knee replacement on 2024.  The patient is doing well at this time.  He is transition from the oxycodone to just ice and Tylenol as needed.  He is ambulating mostly with a cane but can walk without it with a slight limp.  He has been doing therapy consistently and feels he is progressing well.  He denies any issues with the incisions up to this point and denies any distal numbness or tingling.    Review of Systems  Pertinent items are noted in HPI.  All other systems were reviewed and are negative.    Physical Exam  /76   Pulse 59   Ht 5' 8\" (1.727 m)   Wt 98.9 kg (218 lb)   BMI 33.15 kg/m²   Cons: Appears well.  No apparent distress.  Psych: Alert. Oriented x3.  Mood and affect normal.  Eyes: PERRLA, EOMI  Resp: Normal effort.  No audible wheezing or stridor.  CV: Palpable pulse.  No discernable arrhythmia.  No LE edema.  Lymph:  No palpable cervical, axillary, or inguinal lymphadenopathy.  Skin: Warm.  No " palpable masses.  No visible lesions.  Neuro: Normal muscle tone.  Normal and symmetric DTR's.     Right Knee Exam  Alignment:  Normal knee alignment.  Inspection:  Incision clean and dry.  Palpation:   Mild tenderness at kade-incisional area only with mild effusion.  ROM:  Knee Extension 5. Knee Flexion 110(to 2017 in PT).  Strength:   Patient is able to extend the knee to about 20 degree flexion but can be pushed to about 5 degrees passively.  Stability:  Not tested.  Tests:  No pertinent positive or negative tests.  Patella:  Not tested.  Neurovascular:  Sensation intact in DP/SP/Wong/Sa/T nerve distributions. Sensation intact in all digital nerve distributions.  Toes warm and perfused.  Gait:  Antalgic.     Studies Reviewed  I have personally reviewed pertinent films in PACS.  XR of right knee - 3/8/2024 shows prosthesis in good position with no signs of loosening.  No fracture or dislocation seen.    Procedures  No procedures today.    Medical, Surgical, Family, and Social History  The patient's medical history, family history, and social history, were reviewed and updated as appropriate.    Past Medical History:   Diagnosis Date    Arthritis     Gout     Hyperlipidemia     Hypertension        Past Surgical History:   Procedure Laterality Date    CARPAL TUNNEL RELEASE Bilateral     COLONOSCOPY      NEUROMA EXCISION Left     Foot    [place] ARTHRP KNE CONDYLE&PLATU MEDIAL&LAT COMPARTMENTS Right 2/22/2024    Procedure: ARTHROPLASTY KNEE TOTAL, same day d/c;  Surgeon: Migue Martin MD;  Location: [facility] OR;  Service: Orthopedics       History reviewed. No pertinent family history.    Social History     Occupational History    Not on file   Tobacco Use    Smoking status: Never    Smokeless tobacco: Never   Vaping Use    Vaping status: Never Used   Substance and Sexual Activity    Alcohol use: Yes     Alcohol/week: 3.0 standard drinks of alcohol     Types: 3 Standard drinks or equivalent per week    Drug use:  Never    Sexual activity: Not on file       No Known Allergies      Current Outpatient Medications:     aspirin (ECOTRIN LOW STRENGTH) 81 mg EC tablet, Take 1 tablet (81 mg total) by mouth 2 (two) times a day, Disp: 60 tablet, Rfl: 0    atorvastatin (LIPITOR) 20 mg tablet, , Disp: , Rfl:     Cholecalciferol 25 MCG (1000 UT) tablet, Take 1,000 Units by mouth daily, Disp: , Rfl:     Magnesium 80 MG TABS, Take by mouth, Disp: , Rfl:     meloxicam (MOBIC) 15 mg tablet, , Disp: , Rfl:     metoprolol succinate (TOPROL-XL) 50 mg 24 hr tablet, Take 50 mg by mouth daily, Disp: , Rfl:     Multiple Vitamin (multivitamin) tablet, Take 1 tablet by mouth daily, Disp: 30 tablet, Rfl: 1    ascorbic Acid (VITAMIN C) 500 MG CPCR, Take 1 capsule (500 mg total) by mouth daily, Disp: 30 capsule, Rfl: 0    ferrous sulfate 324 (65 Fe) mg, Take 1 tablet (324 mg total) by mouth 2 (two) times a day before meals (Patient not taking: Reported on 3/8/2024), Disp: 60 tablet, Rfl: 0    folic acid (FOLVITE) 1 mg tablet, Take 1 tablet (1 mg total) by mouth daily (Patient not taking: Reported on 3/8/2024), Disp: 30 tablet, Rfl: 0    lisinopril-hydrochlorothiazide (PRINZIDE,ZESTORETIC) 20-12.5 MG per tablet, Take 1 tablet by mouth daily, Disp: , Rfl:     ondansetron (ZOFRAN-ODT) 4 mg disintegrating tablet, Take 1 tablet (4 mg total) by mouth every 8 (eight) hours as needed for nausea or vomiting (Patient not taking: Reported on 3/8/2024), Disp: 15 tablet, Rfl: 0      Fransisco Colby PA-C    Scribe Attestation      I,:   am acting as a scribe while in the presence of the attending physician.:       I,:   personally performed the services described in this documentation    as scribed in my presence.:

## 2024-03-13 ENCOUNTER — APPOINTMENT (OUTPATIENT)
Dept: PHYSICAL THERAPY | Facility: CLINIC | Age: 59
End: 2024-03-13
Payer: COMMERCIAL

## 2024-03-15 ENCOUNTER — TELEPHONE (OUTPATIENT)
Age: 59
End: 2024-03-15

## 2024-03-15 NOTE — TELEPHONE ENCOUNTER
Just calling back to confirm we received the pictures he sent to [facility]. I let patient know they where received and will be reviewed by

## 2024-03-15 NOTE — TELEPHONE ENCOUNTER
"Caller: Patient    Doctor: Mario    Reason for call: Patient had surgery on 2/22/24 and stated he has a bump on the incision that looks like a \"pimple\". Puss is coming out. He put rubbing alcohol on it, but the puss continues to come back.     Call back#: [phone]    Surgery: 2/22/24  ARTHROPLASTY KNEE TOTAL, same day d/c (Right: Knee)   "

## 2024-03-15 NOTE — TELEPHONE ENCOUNTER
Picture was reviewed and was not very concerning at this time.  I did notify the patient that if he continues to have purulent discharge or worsens we will see him on Monday for evaluation.  He did not note any stitch or anything that potentially is causing it and says that they will area is about the size of a pinhead.

## 2024-03-20 ENCOUNTER — APPOINTMENT (OUTPATIENT)
Dept: PHYSICAL THERAPY | Facility: CLINIC | Age: 59
End: 2024-03-20
Payer: COMMERCIAL

## 2024-03-27 ENCOUNTER — EVALUATION (OUTPATIENT)
Dept: PHYSICAL THERAPY | Facility: CLINIC | Age: 59
End: 2024-03-27
Payer: COMMERCIAL

## 2024-03-27 DIAGNOSIS — Z96.651 TOTAL KNEE REPLACEMENT STATUS, RIGHT: ICD-10-CM

## 2024-03-27 DIAGNOSIS — R26.9 GAIT ABNORMALITY: ICD-10-CM

## 2024-03-27 DIAGNOSIS — M25.561 RECURRENT PAIN OF RIGHT KNEE: Primary | ICD-10-CM

## 2024-03-27 PROCEDURE — 97110 THERAPEUTIC EXERCISES: CPT | Performed by: PHYSICAL MEDICINE & REHABILITATION

## 2024-03-27 NOTE — PROGRESS NOTES
"PT Re-Evaluation     Today's date: 3/27/2024  Patient name: Gurdeep Martin  : 1965  MRN: [id number]  Referring provider: Migue Martin, *  Dx:   Encounter Diagnosis     ICD-10-CM    1. Recurrent pain of right knee  M25.561       2. Total knee replacement status, right  Z96.651       3. Gait abnormality  R26.9                      Assessment  Assessment details: Gurdeep has been compliant with attending PT and completing home exercise program since initial eval and reports overall 60-70% improvement in pain/sx and function since start of care.  Gurdeep reports and demonstrates decreased pain, increased strength, increased ROM, improved flexibility, improved balance, and improved overall level of function since initial eval, but is still limited compared to prior level of function. He continues with limitations into end range knee extension > flexion with c/c of stiffness; limited by mild swelling as well R knee. Cont with weakness and mm atrophy R quad; this is improving/progressing. He is compliant with HEP, has joined a gym, and is active at home; has an excellent progressive HEP and help from his wife who is a PTA. Pt is progressing well overall post op. Gurdeep continues with above listed impairments and would benefit from additional skilled PT to address these deficits to return to prior level of function.         Impairments: abnormal gait, abnormal muscle firing, abnormal muscle tone, abnormal or restricted ROM, abnormal movement, activity intolerance, impaired balance, impaired physical strength, lacks appropriate home exercise program, pain with function and weight-bearing intolerance    Symptom irritability: lowUnderstanding of Dx/Px/POC: good   Prognosis: good    Goals  Goals to be achieved post op:     STGs to be achieved in 4 weeks:  1. Pt to demonstrate reduced subjective pain rating \"at worst\" by at least 2-3 points from Initial Eval in order to allow for reduced pain with ADLs and improved " functional activity tolerance. - met   2. Pt to demonstrate increased AROM of R knee flexion to at least 110 degrees and knee extension to no > -5*  in order to allow for greater ease and independence with ADLs and functional mobility. - partially met, cont for extension  3. Pt to demonstrate increased MMT of R knee grossly by at least 1/2-1 grade in order to improve safety and stability with ADLs and functional mobility. - met; Improve R knee extension MMT to at least 4-4+/5 to improve CKC stability R knee  4.Pt will demonstrate ability to safely ambulate at least 200ft with least restrictive AD with minimized gait deviations, independently, and without evidence of instability.- met       LTGs to be achieved in 10-12 weeks/By DC:  1. Pt will be I with HEP in order to continue to improve quality of life and independence and reduce risk for re-injury.   2. Pt to demonstrate return to PLOF and work without limitations or restrictions.   3. Pt to demonstrate improved function as noted by achieving or exceeding predicted score on FOTO outcomes assessment tool.         Plan  Patient would benefit from: skilled physical therapy  Planned modality interventions: cryotherapy and thermotherapy: hydrocollator packs  Planned therapy interventions: manual therapy, motor coordination training, neuromuscular re-education, patient education, postural training, self care, strengthening, stretching, therapeutic activities, therapeutic exercise, gait training, home exercise program, functional ROM exercises and balance  Frequency: 2x week  Duration in visits: 12  Duration in weeks: 6  Plan of Care beginning date: 3/27/2024  Plan of Care expiration date: 5/8/2024  Treatment plan discussed with: patient and family        Subjective Evaluation    History of Present Illness  Mechanism of injury: Pt notes 60-70% return to PLOF at present. Pt notes overall improvement in knee pain, ROM, swelling, mobility, gait etc. Compliant with HEP. Also  going to local gym. Active at home with housework. No longer using walker or other AD. Notes his knee is stiff /sore today. May be from the weather lately. Did have some pain earlier after standing/working downstairs for some time then going back up stairs; pain lateral knee; then it went away. Notes pulling on the inside/outside of the knee with end range knee flexion. No present signs/sx of infection; did seem to have a possible infected stitch/hair follicle however this has improved with no present sx/complaints. No new sx/complaints overall. Knee feels like it may buckle sometimes when first getting up, otherwise no knee instability. Strength is improving. However still feels weakness going downstairs > up with R LE; still needs Hrs for support. Still feels weakness with knee extension exercises. Notes he still struggles with walking. Notes he walks more slowly than usual. R knee knee still gets tired,sore, stiff with being on it long periods. Pain R knee with laying on R or L side. Overall pleased with his progress.  RTD .   Quality of life: good    Patient Goals  Patient goals for therapy: decreased pain, decreased edema, increased motion, increased strength, independence with ADLs/IADLs, return to sport/leisure activities, improved balance and return to work    Pain  Current pain ratin  At best pain ratin  At worst pain ratin  Location: R knee  Quality: throbbing, tight, dull ache and pressure  Relieving factors: rest, support, medications, ice, relaxation and change in position  Aggravating factors: standing, walking and sitting (Bending it, transitional movements)  Progression: improved    Social Support  Steps to enter house: yes  Stairs in house: yes (staying on first floor)   Lives in: multiple-level home  Lives with: spouse    Employment status: working  Treatments  Current treatment: physical therapy        Objective     Observations     Additional Observation Details  Post op TKA  incision closed; clean/dry/intact; one area mid incision with very small scab and min redness proximally only  No present notable signs/sx of infection R knee/leg; will monitor  Mild swelling noted localized to R knee   No calf redness or tenderness R         Neurological Testing     Sensation     Knee   Left Knee   Intact: Light touch    Right Knee   Intact: light touch     Additional Neurological Details  Denies any present n/t R LE     Active Range of Motion   Left Knee   Flexion: WFL  Extension: WFL    Right Knee   Flexion: 117 (tight> pain) degrees   Extension: -7 (tight) degrees     Additional Active Range of Motion Details  Chronic L knee pain and instability per pt    R knee stiffness > pain at end ranges         Mobility   Patellar Mobility:     Right Knee   WFL: medial, lateral, superior and inferior    Strength/Myotome Testing     Left Knee   Flexion: WFL  Extension: WFL    Right Knee   Flexion: 4  Extension: 4-  Quadriceps contraction: poor    Additional Strength Details  Decreased R quad/VMO tone with QS- improving   Poor patellar elevation on R with QS- improved  Cont with R quad mm atrophy , progressing/improving since SOC    R hip grossly 4/5    R ankle grossly 4-4+/5    Ambulation     Ambulation: Level Surfaces   Ambulation with assistive device: independent  Ambulation without assistive device: independent    Additional Level Surfaces Ambulation Details  WBAT R LE  No AD   Min reduced R stance time  Mild lateral flexion with R stance   Slight lack of TKE with R stance            Ambulation: Stairs   Ascend stairs: independent  Pattern: reciprocal  Railings: two rails  Descend stairs: independent  Pattern: non-reciprocal  Railings: two rails    Additional Stairs Ambulation Details  Struggles with going down steps with RLE; needs B HHA; weakness R quad per pt    Step to step vs step over step per pt ascending               Precautions: HTN, L knee pain , s/p R TKA 2/22      Re-eval Date:  5/8    Date  "2/26/2024  3/6 3/27     Visit Count 1 2/8 3/8     FOTO 2/26/2024        Pain In See IE 1-2/10 4/10     Pain Out See IE 1-2/10 4/10         Manuals 2/26/2024  3/6 3/6     R knee pROM with gentle end range stretch as tolerated, R patellar mobs  R knee pROM with gentle end range stretch as tolerated, R patellar mobs 10' Motion check      R HS, Calf, Hip flexor/quad stretch          PROM 5-117 -7- 117*             Neuro Re-Ed        Romberg      Semi tandem      Tandem    Reviewed HEP      HEP      Perturbation training                                                 Ther Ex        SRC vs Nustep AAROM R Knee       R heel slides             15x/5-10\"  Nu-step L3 10'          20x/3-5\" Upright bike L3 10' full ROM       20x/5\"      R knee AAROM bottom step      Static R knee extension Reviewed seated AAROM EOB or chair     Supine roll under ankle   10x10\"   Prone hang R knee ext 2' 2.5#  Reviewed knee ext stretching HEP supine/seated with light weight      R QS      Calf stretch      HS stretch  10x/10\"  20x/3-5\"      Incline 4x30\"      Strap 4x30\" Roll under heel for ext- HEP       R hip flexor/quad stretch    HRs         Reviewed HEP          20x      SLRs standing-->mat table      HS curls      LAQ Standing 3 way R- reviewed HEP    Reviewed HEP Supine   Flex AA 2x10  Abd 2x10              SAQ sm red roll 2x10 Flexion 0#  3x10 cues for QS       HEP        90-30*   3# 3x10      Mini squats      Step ups       Bottom step  2x10 HEP    8 in step   2x10         Mini lunges with HHA as needed   HEP              Ther Activity pt education regarding pathophysiology/pathoanatomy of present pain/sx and condition, role of PT in improving pain/sx and function, pt education regarding activity modification to avoid exacerbation of sx and delayed recovery; ; review of stairs, gait, and transfer safety with AD post TKR; education regarding importance of maximizing gradual knee ROM post op to tolerance to facilitate progress and max LOF "                        Gait Training Upcoming with RW--> no AD as able                        Modalities CP with elevation and knee ext after sessions - deferred to home today  Deferred to home                          3/27/2024  - HEP was reviewed this date for above noted exercises. Pt demonstrated understanding without incident and without questions/concerns. Will continue to update upcoming.

## 2024-04-01 ENCOUNTER — OFFICE VISIT (OUTPATIENT)
Dept: OBGYN CLINIC | Facility: MEDICAL CENTER | Age: 59
End: 2024-04-01

## 2024-04-01 VITALS
HEIGHT: 68 IN | BODY MASS INDEX: 33.95 KG/M2 | DIASTOLIC BLOOD PRESSURE: 69 MMHG | WEIGHT: 224 LBS | HEART RATE: 72 BPM | SYSTOLIC BLOOD PRESSURE: 133 MMHG

## 2024-04-01 DIAGNOSIS — Z96.651 STATUS POST TOTAL RIGHT KNEE REPLACEMENT: Primary | ICD-10-CM

## 2024-04-01 PROCEDURE — 99024 POSTOP FOLLOW-UP VISIT: CPT | Performed by: PHYSICIAN ASSISTANT

## 2024-04-01 RX ORDER — SULFAMETHOXAZOLE AND TRIMETHOPRIM 800; 160 MG/1; MG/1
1 TABLET ORAL EVERY 12 HOURS SCHEDULED
Qty: 14 TABLET | Refills: 0 | Status: SHIPPED | OUTPATIENT
Start: 2024-04-01 | End: 2024-04-08

## 2024-04-01 NOTE — PROGRESS NOTES
"Orthopaedic Surgery - Office Note  Gurdeep Martin (59 y.o. male)   : 1965   MRN: [id number]  Encounter Date: 2024    Chief Complaint   Patient presents with    Right Knee - Post-op       Assessment / Plan  Status post right total knee replacement on 2024    We did prescribe the patient a course of Bactrim DS twice daily for 7 days small area superficial drainage in the middle aspect of the incision.  I did debride the area first which revealed to small pieces of STRATAFIX sutures that were removed and cleaned it with alcohol.  Over the next week we are recommending limiting knee bending to give the skin a chance to heal.  Continue with ice and analgesics/NSAIDs as needed for pain.  We did previously discuss the need for dental antibiotic prophylaxis as a lifetime recommendation going forward.  He will contact the office if he needs anything for a dental visit.  Return in about 1 week (around 2024) for follow up with Dr. Martin.    History of Present Illness  Gurdeep Martin is a 59 y.o. male who presents for follow-up status post right total knee replacement on 2024.  On 3/15/2024 the patient noted a small area of redness that had a very mild amount of drainage.  He has been observing it over the last 2 weeks and over the last few days has had increased in the area of redness with some purulent drainage noted.  He does have soreness deep in his knee but no sharp pains.  It is tender locally around the erythematic area anteriorly.  The patient is doing well at this time overall otherwise.  He has been working out regularly in the gym on his own and ambulating normally.  He denies any distal numbness or tingling.    Review of Systems  Pertinent items are noted in HPI.  All other systems were reviewed and are negative.    Physical Exam  /69   Pulse 72   Ht 5' 8\" (1.727 m)   Wt 102 kg (224 lb)   BMI 34.06 kg/m²   Cons: Appears well.  No apparent distress.  Psych: Alert. Oriented " x3.  Mood and affect normal.  Eyes: PERRLA, EOMI  Resp: Normal effort.  No audible wheezing or stridor.  CV: Palpable pulse.  No discernable arrhythmia.  No LE edema.  Lymph:  No palpable cervical, axillary, or inguinal lymphadenopathy.  Skin: Warm.  No palpable masses.  No visible lesions.  Neuro: Normal muscle tone.  Normal and symmetric DTR's.     Right Knee Exam  Alignment:  Normal knee alignment.  Inspection:   In the middle aspect of the incision he does have a small approximately 2 cm area of erythema with 2 small spots of necrotic tissue and purulent drainage with pressure.  This area was debrided of the necrotic tissue and to use small pieces of Monocryl/STRATAFIX were removed.  Palpation:   Mild tenderness at kade-incisional area.  Does have some erythema and small amount of purulent drainage..  ROM:  Knee Extension 3. Knee Flexion 120.  Strength:  Able to SLR without lag. Able to actively extend knee against gravity.  Stability:  Not tested.  Tests:  No pertinent positive or negative tests.  Patella:  Patella tracks centrally without crepitus.  Neurovascular:  Sensation intact in DP/SP/Wong/Sa/T nerve distributions. Sensation intact in all digital nerve distributions.  Toes warm and perfused.  Gait:  Normal.     Studies Reviewed  I have personally reviewed pertinent films in PACS.  XR of right knee - 3/8/2024 shows prosthesis in good position with no signs of loosening.  No fracture or dislocation seen.    Procedures  No procedures today.    Medical, Surgical, Family, and Social History  The patient's medical history, family history, and social history, were reviewed and updated as appropriate.    Past Medical History:   Diagnosis Date    Arthritis     Gout     Hyperlipidemia     Hypertension        Past Surgical History:   Procedure Laterality Date    CARPAL TUNNEL RELEASE Bilateral     COLONOSCOPY      NEUROMA EXCISION Left     Foot    [place] ARTHRP KNE CONDYLE&PLATU MEDIAL&LAT COMPARTMENTS Right 2/22/2024     Procedure: ARTHROPLASTY KNEE TOTAL, same day d/c;  Surgeon: Migue Martin MD;  Location: [facility];  Service: Orthopedics       No family history on file.    Social History     Occupational History    Not on file   Tobacco Use    Smoking status: Never    Smokeless tobacco: Never   Vaping Use    Vaping status: Never Used   Substance and Sexual Activity    Alcohol use: Yes     Alcohol/week: 3.0 standard drinks of alcohol     Types: 3 Standard drinks or equivalent per week    Drug use: Never    Sexual activity: Not on file       No Known Allergies      Current Outpatient Medications:     atorvastatin (LIPITOR) 20 mg tablet, , Disp: , Rfl:     lisinopril-hydrochlorothiazide (PRINZIDE,ZESTORETIC) 20-12.5 MG per tablet, Take 1 tablet by mouth daily, Disp: , Rfl:     metoprolol succinate (TOPROL-XL) 50 mg 24 hr tablet, Take 50 mg by mouth daily, Disp: , Rfl:     Multiple Vitamin (multivitamin) tablet, Take 1 tablet by mouth daily, Disp: 30 tablet, Rfl: 1    sulfamethoxazole-trimethoprim (BACTRIM DS) 800-160 mg per tablet, Take 1 tablet by mouth every 12 (twelve) hours for 7 days, Disp: 14 tablet, Rfl: 0    ascorbic Acid (VITAMIN C) 500 MG CPCR, Take 1 capsule (500 mg total) by mouth daily, Disp: 30 capsule, Rfl: 0    aspirin (ECOTRIN LOW STRENGTH) 81 mg EC tablet, Take 1 tablet (81 mg total) by mouth 2 (two) times a day, Disp: 60 tablet, Rfl: 0    Cholecalciferol 25 MCG (1000 UT) tablet, Take 1,000 Units by mouth daily (Patient not taking: Reported on 4/1/2024), Disp: , Rfl:     ferrous sulfate 324 (65 Fe) mg, Take 1 tablet (324 mg total) by mouth 2 (two) times a day before meals (Patient not taking: Reported on 3/8/2024), Disp: 60 tablet, Rfl: 0    folic acid (FOLVITE) 1 mg tablet, Take 1 tablet (1 mg total) by mouth daily (Patient not taking: Reported on 3/8/2024), Disp: 30 tablet, Rfl: 0    Magnesium 80 MG TABS, Take by mouth (Patient not taking: Reported on 4/1/2024), Disp: , Rfl:     meloxicam (MOBIC) 15 mg  tablet, , Disp: , Rfl:     ondansetron (ZOFRAN-ODT) 4 mg disintegrating tablet, Take 1 tablet (4 mg total) by mouth every 8 (eight) hours as needed for nausea or vomiting (Patient not taking: Reported on 3/8/2024), Disp: 15 tablet, Rfl: 0      Fransisco Colby PA-C    Scribe Attestation      I,:   am acting as a scribe while in the presence of the attending physician.:       I,:   personally performed the services described in this documentation    as scribed in my presence.:

## 2024-04-08 ENCOUNTER — OFFICE VISIT (OUTPATIENT)
Dept: OBGYN CLINIC | Facility: MEDICAL CENTER | Age: 59
End: 2024-04-08

## 2024-04-08 VITALS
BODY MASS INDEX: 33.65 KG/M2 | WEIGHT: 222 LBS | DIASTOLIC BLOOD PRESSURE: 72 MMHG | SYSTOLIC BLOOD PRESSURE: 129 MMHG | HEIGHT: 68 IN | HEART RATE: 65 BPM

## 2024-04-08 DIAGNOSIS — M17.11 PRIMARY OSTEOARTHRITIS OF RIGHT KNEE: Primary | ICD-10-CM

## 2024-04-08 PROCEDURE — 99024 POSTOP FOLLOW-UP VISIT: CPT | Performed by: ORTHOPAEDIC SURGERY

## 2024-04-08 NOTE — PROGRESS NOTES
"Orthopaedic Surgery - Office Note  Gurdeep Martin (59 y.o. male)   : 1965   MRN: [id number]  Encounter Date: 2024    Assessment / Plan    S/p R TKA, with good progression    His incision is dry with no signs of infection. He finished the anti-biotic this morning  He was educated on S/S of infection and instructed to call if something arises   Continue with PT and progress as tolerated  Ice, heat and anti-inflammatories prn   Follow-up:  Return if symptoms worsen or fail to improve. During his next visit, he would like to discuss a LEFT TKA      Chief Complaint / Date of Onset  Wound check, no current complaints   Injury Mechanism / Date  None  Surgery / Date  2024    History of Present Illness   Gurdeep Martin is a 59 y.o. male who presents for follow up s/p R TKA on 2024. He is here today for a 1 week wound check. He denies any drainage, fever, chills or other signs of infection. He did finish his anti-biotic this morning. He did wear the immobilizer for a few days to help with extension which he feels helped.     Treatment Summary  Medications / Modalities  Oral anti-inflammatories prn   Bracing / Immobilization  None  Physical Therapy  Going to resume now post operatively  Injections  None  Prior Surgeries  None  Other Treatments  None    Employment / Current Status  Full time, return back to work     Sport / Organization / Current Status  N/A      Review of Systems  Pertinent items are noted in HPI.  All other systems were reviewed and are negative.      Physical Exam  /72   Pulse 65   Ht 5' 8\" (1.727 m)   Wt 101 kg (222 lb)   BMI 33.75 kg/m²   Cons: Appears well.  No apparent distress.  Psych: Alert. Oriented x3.  Mood and affect normal.  Eyes: PERRLA, EOMI  Resp: Normal effort.  No audible wheezing or stridor.  CV: Palpable pulse.  No discernable arrhythmia.  No LE edema.  Lymph:  No palpable cervical, axillary, or inguinal lymphadenopathy.  Skin: Warm.  No palpable " masses.  No visible lesions.  Neuro: Normal muscle tone.  Normal and symmetric DTR's.     Right Knee Exam  Alignment:  Normal knee alignment.  Inspection:  No swelling. minimal erythema. No ecchymosis.  Palpation:  No tenderness. moderate effusion.  ROM:  Knee Extension 0. Knee Flexion 100.  Strength:  Able to actively extend knee against gravity.  Stability:  (-) Varus instability. (-) Valgus instability.  Tests:  No pertinent positive or negative tests.  Patella:  Normal patellar mobility.  Neurovascular:  Sensation intact in DP/SP/Wong/Sa/T nerve distributions.  2+ DP & PT pulses.  Gait:  Smooth.       Studies Reviewed  I have personally reviewed pertinent films in PACS.  XR of right knee - 3/8/2024 shows prosthesis in good position with no signs of loosening.  No fracture or dislocation seen    Procedures  No procedures today.    Medical, Surgical, Family, and Social History  The patient's medical history, family history, and social history, were reviewed and updated as appropriate.    Past Medical History:   Diagnosis Date    Arthritis     Gout     Hyperlipidemia     Hypertension        Past Surgical History:   Procedure Laterality Date    CARPAL TUNNEL RELEASE Bilateral     COLONOSCOPY      NEUROMA EXCISION Left     Foot    [place] ARTHRP KNE CONDYLE&PLATU MEDIAL&LAT COMPARTMENTS Right 2/22/2024    Procedure: ARTHROPLASTY KNEE TOTAL, same day d/c;  Surgeon: Migue Martin MD;  Location: [facility];  Service: Orthopedics       No family history on file.    Social History     Occupational History    Not on file   Tobacco Use    Smoking status: Never    Smokeless tobacco: Never   Vaping Use    Vaping status: Never Used   Substance and Sexual Activity    Alcohol use: Yes     Alcohol/week: 3.0 standard drinks of alcohol     Types: 3 Standard drinks or equivalent per week    Drug use: Never    Sexual activity: Not on file       No Known Allergies      Current Outpatient Medications:     aspirin (ECOTRIN LOW  STRENGTH) 81 mg EC tablet, Take 1 tablet (81 mg total) by mouth 2 (two) times a day, Disp: 60 tablet, Rfl: 0    atorvastatin (LIPITOR) 20 mg tablet, , Disp: , Rfl:     lisinopril-hydrochlorothiazide (PRINZIDE,ZESTORETIC) 20-12.5 MG per tablet, Take 1 tablet by mouth daily, Disp: , Rfl:     metoprolol succinate (TOPROL-XL) 50 mg 24 hr tablet, Take 50 mg by mouth daily, Disp: , Rfl:     Multiple Vitamin (multivitamin) tablet, Take 1 tablet by mouth daily, Disp: 30 tablet, Rfl: 1    sulfamethoxazole-trimethoprim (BACTRIM DS) 800-160 mg per tablet, Take 1 tablet by mouth every 12 (twelve) hours for 7 days, Disp: 14 tablet, Rfl: 0    ascorbic Acid (VITAMIN C) 500 MG CPCR, Take 1 capsule (500 mg total) by mouth daily (Patient not taking: Reported on 4/8/2024), Disp: 30 capsule, Rfl: 0    Cholecalciferol 25 MCG (1000 UT) tablet, Take 1,000 Units by mouth daily (Patient not taking: Reported on 4/1/2024), Disp: , Rfl:     ferrous sulfate 324 (65 Fe) mg, Take 1 tablet (324 mg total) by mouth 2 (two) times a day before meals (Patient not taking: Reported on 3/8/2024), Disp: 60 tablet, Rfl: 0    folic acid (FOLVITE) 1 mg tablet, Take 1 tablet (1 mg total) by mouth daily (Patient not taking: Reported on 3/8/2024), Disp: 30 tablet, Rfl: 0    Magnesium 80 MG TABS, Take by mouth (Patient not taking: Reported on 4/1/2024), Disp: , Rfl:     meloxicam (MOBIC) 15 mg tablet, , Disp: , Rfl:     ondansetron (ZOFRAN-ODT) 4 mg disintegrating tablet, Take 1 tablet (4 mg total) by mouth every 8 (eight) hours as needed for nausea or vomiting (Patient not taking: Reported on 3/8/2024), Disp: 15 tablet, Rfl: 0      Laura Coffman    Scribe Attestation      I,:  Laura Coffman am acting as a scribe while in the presence of the attending physician.:       I,:  Migue Martin MD personally performed the services described in this documentation    as scribed in my presence.:

## 2024-04-17 ENCOUNTER — EVALUATION (OUTPATIENT)
Dept: PHYSICAL THERAPY | Facility: CLINIC | Age: 59
End: 2024-04-17
Payer: COMMERCIAL

## 2024-04-17 DIAGNOSIS — M25.561 RECURRENT PAIN OF RIGHT KNEE: Primary | ICD-10-CM

## 2024-04-17 DIAGNOSIS — R26.9 GAIT ABNORMALITY: ICD-10-CM

## 2024-04-17 DIAGNOSIS — Z96.651 TOTAL KNEE REPLACEMENT STATUS, RIGHT: ICD-10-CM

## 2024-04-17 PROCEDURE — 97110 THERAPEUTIC EXERCISES: CPT | Performed by: PHYSICAL MEDICINE & REHABILITATION

## 2024-04-17 NOTE — PROGRESS NOTES
PT Re-Evaluation     Today's date: 2024  Patient name: Gurdeep Martin  : 1965  MRN: [id number]  Referring provider: Migue Martin, *  Dx:   Encounter Diagnosis     ICD-10-CM    1. Recurrent pain of right knee  M25.561       2. Total knee replacement status, right  Z96.651       3. Gait abnormality  R26.9                        Assessment  Assessment details: Gurdeep has been compliant with attending PT and completing home exercise program since initial eval and reports overall 60-70% improvement in pain/sx and function since start of care.  Gurdeep reports and demonstrates decreased pain, increased strength, increased ROM, improved flexibility, improved balance, and improved overall level of function since initial eval, but is still limited compared to prior level of function. He continues with limitations into end range knee extension > flexion with c/c of stiffness; limited by mild swelling as well R knee. Cont with weakness and mm atrophy R quad; this is improving/progressing. He is compliant with HEP, has joined a gym, and is active at home; has an excellent progressive HEP and help from his wife who is a PTA. Pt is progressing well overall post op. He has been able to RTW. He is largely I with exercise program. Gurdeep continues with above listed impairments and would benefit from additional skilled PT vs d/c to HEP (2* I with HEP and assistance/S from his wife who is a PTA) to address these deficits to return to prior level of function. He RTD this Friday for f/u. Potential d/c to HEP upcoming pending f/u with MD.          Impairments: abnormal gait, abnormal muscle firing, abnormal muscle tone, abnormal or restricted ROM, abnormal movement, activity intolerance, impaired balance, impaired physical strength, lacks appropriate home exercise program, pain with function and weight-bearing intolerance    Symptom irritability: lowUnderstanding of Dx/Px/POC: good   Prognosis: good    Goals  Goals to be  "achieved post op:     STGs to be achieved in 4 weeks:  1. Pt to demonstrate reduced subjective pain rating \"at worst\" by at least 2-3 points from Initial Eval in order to allow for reduced pain with ADLs and improved functional activity tolerance. - met   2. Pt to demonstrate increased AROM of R knee flexion to at least 110 degrees and knee extension to no > -5*  in order to allow for greater ease and independence with ADLs and functional mobility. - partially met, cont for extension  3. Pt to demonstrate increased MMT of R knee grossly by at least 1/2-1 grade in order to improve safety and stability with ADLs and functional mobility. - met; Improve R knee extension MMT to at least 4-4+/5 to improve CKC stability R knee  4.Pt will demonstrate ability to safely ambulate at least 200ft with least restrictive AD with minimized gait deviations, independently, and without evidence of instability.- met       LTGs to be achieved in 10-12 weeks/By DC:  1. Pt will be I with HEP in order to continue to improve quality of life and independence and reduce risk for re-injury. -met  2. Pt to demonstrate return to PLOF and work without limitations or restrictions. - partially met   3. Pt to demonstrate improved function as noted by achieving or exceeding predicted score on FOTO outcomes assessment tool. - progressing         Plan  Patient would benefit from: skilled physical therapy  Planned modality interventions: cryotherapy and thermotherapy: hydrocollator packs  Planned therapy interventions: manual therapy, motor coordination training, neuromuscular re-education, patient education, postural training, self care, strengthening, stretching, therapeutic activities, therapeutic exercise, gait training, home exercise program, functional ROM exercises and balance  Frequency: 2x week  Duration in visits: 8  Duration in weeks: 4  Plan of Care beginning date: 4/17/2024  Plan of Care expiration date: 5/17/2024  Treatment plan discussed " with: patient and family        Subjective Evaluation    History of Present Illness  Mechanism of injury: Pt notes the  after last time he was in PT, he noted he felt it looked like the incision was getting possibly getting infected again; sent pictures to MD; had an appt with him Monday. More small /pieces of sutures removed from incision per pt. Told not to bend the knee until MD said he could again and to rest; pt notes last week he was cleared to bend to the knee again. Went back to doing his exercises, bike riding, going to the gym; also RTW . Notes overall he is doing better. Knee is stiff today. No real pain. Notes his knee and quad can get sore/achy when he is on it a lot or doing a lot. Notes he feels like he cannot bend the knee as far as he was a few weeks ago; also feels he is still not straightening it all the way. Feels his R LE is getting stronger. No knee buckling or giving way. RTD Friday. Compliant with HEP. Has been going to the gym and doing the LE machines (leg ext etc).   Quality of life: good    Patient Goals  Patient goals for therapy: decreased pain, decreased edema, increased motion, increased strength, independence with ADLs/IADLs, return to sport/leisure activities, improved balance and return to work    Pain  Current pain rating: 3  At best pain rating: 3  At worst pain ratin  Location: R knee  Quality: dull ache, tight and sharp  Relieving factors: rest, support, medications, ice, relaxation and change in position  Aggravating factors: standing, walking and sitting (Bending it, transitional movements; sleeping on his side)  Progression: improved    Social Support  Steps to enter house: yes  Stairs in house: yes (staying on first floor)   Lives in: multiple-level home  Lives with: spouse    Employment status: working  Treatments  Current treatment: physical therapy      Objective     Observations     Additional Observation Details  Post op TKA incision closed;  clean/dry/intact; one area mid incision and one area upper incision with small scabs; no enlargement/bumps; no redness, no warmth, etc  No present notable signs/sx of infection R knee/leg; will monitor  Mild swelling noted localized to R knee - min-no swelling today at RE   No calf redness or tenderness R         Neurological Testing     Sensation     Knee   Left Knee   Intact: Light touch    Right Knee   Intact: light touch     Additional Neurological Details  Denies any present n/t R LE     Active Range of Motion   Left Knee   Flexion: WFL  Extension: WFL    Right Knee   Flexion: 118 (tight> pain) degrees   Extension: -6 (tight) degrees     Additional Active Range of Motion Details  Chronic L knee pain and instability per pt    R knee stiffness > pain at end ranges         Mobility   Patellar Mobility:     Right Knee   WFL: medial, lateral, superior and inferior    Strength/Myotome Testing     Left Knee   Flexion: WFL  Extension: WFL    Right Knee   Flexion: 4  Extension: 4-  Quadriceps contraction: fair    Additional Strength Details  Decreased R quad/VMO tone with QS- improving   Poor patellar elevation on R with QS- improved  Cont with R quad mm atrophy , progressing/improving since SOC    R hip grossly 4-4+/5    R ankle grossly 4-4+/5    Ambulation     Ambulation: Level Surfaces   Ambulation with assistive device: independent  Ambulation without assistive device: independent    Additional Level Surfaces Ambulation Details  WBAT R LE  No AD   Min reduced R stance time  Mild lateral flexion with R stance   Slight lack of TKE with R stance            Ambulation: Stairs   Ascend stairs: independent  Pattern: reciprocal  Railings: two rails  Descend stairs: independent  Pattern: non-reciprocal  Railings: two rails    Additional Stairs Ambulation Details  Struggles with going down steps with RLE; needs HHA on HR; weakness R quad per pt- improving     Step to step vs step over step per pt ascending              "  Precautions: HTN, L knee pain , s/p R TKA 2/22      Re-eval Date:  5/17    Date 2/26/2024  3/6 3/27 4/17    Visit Count 1 2/8 3/8 4    FOTO 2/26/2024        Pain In See IE 1-2/10 4/10 3/10    Pain Out See IE 1-2/10 4/10 3/10        Manuals 2/26/2024  3/6 3/6 4/17    R knee pROM with gentle end range stretch as tolerated, R patellar mobs  R knee pROM with gentle end range stretch as tolerated, R patellar mobs 10' Motion check  Motion check     R HS, Calf, Hip flexor/quad stretch          PROM 5-117 -7- 117* -6-118*            Neuro Re-Ed        Romberg      Semi tandem      Tandem    Reviewed HEP      HEP      Perturbation training                                                 Ther Ex        SRC vs Nustep AAROM R Knee       R heel slides             15x/5-10\"  Nu-step L3 10'          20x/3-5\" Upright bike L3 10' full ROM       20x/5\"  Upright bike L3 10' full ROM       2x10/10\"    R knee AAROM bottom step      Static R knee extension Reviewed seated AAROM EOB or chair     Supine roll under ankle   10x10\"   Prone hang R knee ext 2' 2.5#  Reviewed knee ext stretching HEP supine/seated with light weight  Prone hang R knee ext 2' 0#- self overpressure as carolyn  Reviewed knee ext stretching HEP supine/seated with light weight     R QS      Calf stretch      HS stretch  10x/10\"  20x/3-5\"      Incline 4x30\"      Strap 4x30\" Roll under heel for ext- HEP   Roll under heel for ext- 10x10\"    R hip flexor/quad stretch    HRs         Reviewed HEP          20x  EOT 4x30\"     SLRs standing-->mat table      HS curls      LAQ Standing 3 way R- reviewed HEP    Reviewed HEP Supine   Flex AA 2x10  Abd 2x10              SAQ sm red roll 2x10 Flexion 0#  3x10 cues for QS       HEP        90-30*   3# 3x10  Flexion 0#  3x10 cues for QS       HS curl machine   Single R LE 11#  3x10         Knee extension machine   R LE 11#   3x10    Mini squats      Step ups       Bottom step  2x10 HEP      8 in step   2x10  Leg press   Double   80# 2x10 "     8 in step 2x10        Mini lunges with HHA as needed   HEP              Ther Activity pt education regarding pathophysiology/pathoanatomy of present pain/sx and condition, role of PT in improving pain/sx and function, pt education regarding activity modification to avoid exacerbation of sx and delayed recovery; ; review of stairs, gait, and transfer safety with AD post TKR; education regarding importance of maximizing gradual knee ROM post op to tolerance to facilitate progress and max LOF                        Gait Training Upcoming with RW--> no AD as able                        Modalities CP with elevation and knee ext after sessions - deferred to home today  Deferred to home                          4/17/2024  - HEP was reviewed this date for above noted exercises. Pt demonstrated understanding without incident and without questions/concerns. Will continue to update upcoming.

## 2024-04-19 ENCOUNTER — OFFICE VISIT (OUTPATIENT)
Dept: OBGYN CLINIC | Facility: MEDICAL CENTER | Age: 59
End: 2024-04-19
Payer: COMMERCIAL

## 2024-04-19 VITALS — WEIGHT: 222 LBS | HEIGHT: 68 IN | BODY MASS INDEX: 33.65 KG/M2

## 2024-04-19 DIAGNOSIS — Z96.651 STATUS POST TOTAL RIGHT KNEE REPLACEMENT: Primary | ICD-10-CM

## 2024-04-19 PROCEDURE — 99213 OFFICE O/P EST LOW 20 MIN: CPT | Performed by: ORTHOPAEDIC SURGERY

## 2024-04-19 RX ORDER — AMOXICILLIN 500 MG/1
500 CAPSULE ORAL EVERY 8 HOURS SCHEDULED
Qty: 4 CAPSULE | Refills: 0 | Status: SHIPPED | OUTPATIENT
Start: 2024-04-19 | End: 2024-04-20

## 2024-04-19 NOTE — PROGRESS NOTES
Orthopaedic Surgery - Consultation  Gurdeep Martin (59 y.o. male)   : 1965   MRN: [id number]  Date: 2024   Encounter: [id number]   Unit/Bed#: ?      Assessment / Plan  8 weeks S/p R TKA, DOS 24  Left knee osteoarthritis - possibly wants TKA in 2024      The patient is to continue with physical therapy as prescribed and may transition to a home exercise plan when appropriate.  An emphasis was placed on achieving the last 10 degrees of knee extension.  Continue with Tylenol, ice and heat as needed for symptom management. The patient may use ibuprofen at this time.   Continue washing with warm soapy water. Avoid picking at scabs and allow them to fall off naturally. Can cover with bandage if he is wearing pants.  Once the scabs fall off he may submerge in water.  Dental antibiotic prophylactics was discussed with the patient. The patient should wait 3 months from the surgical date prior to dental work.   The patient may donate blood at this time.  Obtain new x-rays of left knee at next visit for surgical planning.  Return in about 2 months (around 2024) for f/u, R Knee PO #3, L Knee X-rays .      History of Present Illness  Gurdeep Martin is a 59 y.o. male who presents 8 weeks status post right total knee arthroplasty, DOS 2024. The patient is doing well overall. He notes tightness in his knee on occasion. He was re-evaluated at physical therapy 2 days ago and was measured 6-118 degrees. Pain is well controlled at this time. He has nighttime discomfort that is improving. He is using Tylenol as needed for symptom management.  The patient had a recent infection in his knee required antibiotics.  The patient has completed his antibiotic course.    The patient also has left knee osteoarthritis.  The patient would like to move forward with left total knee arthroplasty in August.  He would like to recover a bit more prior to moving forward with surgery at this time. He is involved  with projects at work every 3 months and should complete this project around the end of July.    Review of Systems  Negative for chest pain and shortness of breath    Medical, Surgical, Family, and Social History    Past Medical History:   Diagnosis Date   • Arthritis    • Gout    • Hyperlipidemia    • Hypertension        Past Surgical History:   Procedure Laterality Date   • CARPAL TUNNEL RELEASE Bilateral    • COLONOSCOPY     • NEUROMA EXCISION Left     Foot   • NM ARTHRP KNE CONDYLE&PLATU MEDIAL&LAT COMPARTMENTS Right 2/22/2024    Procedure: ARTHROPLASTY KNEE TOTAL, same day d/c;  Surgeon: Migue Martin MD;  Location: [facility];  Service: Orthopedics       History reviewed. No pertinent family history.    Social History     Occupational History   • Not on file   Tobacco Use   • Smoking status: Never   • Smokeless tobacco: Never   Vaping Use   • Vaping status: Never Used   Substance and Sexual Activity   • Alcohol use: Yes     Alcohol/week: 3.0 standard drinks of alcohol     Types: 3 Standard drinks or equivalent per week   • Drug use: Never   • Sexual activity: Not on file       No Known Allergies    No current facility-administered medications for this visit.     (Not in a hospital admission)      Vitals  [unfilled]  Body mass index is 33.75 kg/m².  [unfilled]    Physical Exam  General:  Alert & oriented x3, no distress, appears stated age  HEENT:  EOMI, eyes clear, hearing intact, mucous membranes moist  Neck:  Supple, non-tender, trachea midline, no lymphadenopathy  Cardiovascular:  Regular rate, no discernable arrhythmia  Pulmonary:  Regular rate, respirations non-labored   Abdominal:  Soft, non-tender    Ortho Exam - Right Lower Extremity  Right Knee Exam  Alignment:  Normal knee alignment.  Inspection:   mild swelling. No erythema. No deformity.  Palpation:  No tenderness.  ROM:  Knee Extension 10. Knee Flexion 120.  Strength:  Not tested.  Stability:  No objective knee instability. Stable  Varus / Valgus stress, Lachman, and Posterior drawer.  Tests:   none performed  Patella:  Normal patellar mobility.  Neurovascular:  Sensation intact L1-S1 BLE.  2+ DP & PT pulses.  Gait:  Not tested.    Imaging  No studies to review        Counseling / Coordination of Care  Total floor / unit time spent today 20 minutes. Greater than 50% of total time was spent with the patient and / or family counseling and / or coordination of care.    Nelsy Hudson  Scribe Attestation    I,:  Nelsy Hudson am acting as a scribe while in the presence of the attending physician.:       I,:  Migue Martin MD personally performed the services described in this documentation    as scribed in my presence.:

## 2024-06-21 ENCOUNTER — OFFICE VISIT (OUTPATIENT)
Dept: OBGYN CLINIC | Facility: MEDICAL CENTER | Age: 59
End: 2024-06-21
Payer: COMMERCIAL

## 2024-06-21 ENCOUNTER — APPOINTMENT (OUTPATIENT)
Dept: RADIOLOGY | Facility: MEDICAL CENTER | Age: 59
End: 2024-06-21
Payer: COMMERCIAL

## 2024-06-21 VITALS
HEIGHT: 68 IN | SYSTOLIC BLOOD PRESSURE: 121 MMHG | HEART RATE: 59 BPM | BODY MASS INDEX: 34.19 KG/M2 | DIASTOLIC BLOOD PRESSURE: 75 MMHG | WEIGHT: 225.6 LBS

## 2024-06-21 DIAGNOSIS — M17.12 PRIMARY OSTEOARTHRITIS OF LEFT KNEE: Primary | ICD-10-CM

## 2024-06-21 DIAGNOSIS — M25.562 LEFT KNEE PAIN, UNSPECIFIED CHRONICITY: ICD-10-CM

## 2024-06-21 PROCEDURE — 99214 OFFICE O/P EST MOD 30 MIN: CPT | Performed by: ORTHOPAEDIC SURGERY

## 2024-06-21 PROCEDURE — 73564 X-RAY EXAM KNEE 4 OR MORE: CPT

## 2024-06-21 RX ORDER — CHLORHEXIDINE GLUCONATE ORAL RINSE 1.2 MG/ML
15 SOLUTION DENTAL ONCE
OUTPATIENT
Start: 2024-06-21 | End: 2024-06-21

## 2024-06-21 RX ORDER — FOLIC ACID 1 MG/1
1 TABLET ORAL DAILY
Qty: 30 TABLET | Refills: 0 | Status: SHIPPED | OUTPATIENT
Start: 2024-06-21

## 2024-06-21 RX ORDER — FERROUS SULFATE 324(65)MG
324 TABLET, DELAYED RELEASE (ENTERIC COATED) ORAL
Qty: 60 TABLET | Refills: 0 | Status: SHIPPED | OUTPATIENT
Start: 2024-06-21

## 2024-06-21 RX ORDER — TRANEXAMIC ACID 10 MG/ML
1000 INJECTION, SOLUTION INTRAVENOUS ONCE
OUTPATIENT
Start: 2024-06-21 | End: 2024-06-21

## 2024-06-21 RX ORDER — CEFAZOLIN SODIUM 2 G/50ML
2000 SOLUTION INTRAVENOUS ONCE
OUTPATIENT
Start: 2024-06-21 | End: 2024-06-21

## 2024-06-21 NOTE — PROGRESS NOTES
"Orthopaedic Surgery - Office Note  Gurdeep Martin (59 y.o. male)   : 1965   MRN: [id number]  Encounter Date: 2024    Assessment / Plan    Left knee osteoarthritis  Right TKA 24    The diagnosis and treatment options were reviewed.  The patient wishes to proceed with Left total knee arthroplasty.  The risks, benefits, and alternatives were discussed.  Written consent was obtained.  Vitamins sent to pharmacy on file  ASA for DVT prophylaxis follow surgery   Follow-up:  Return for Post op.      Chief Complaint / Date of Onset  B/L knee pain   Injury Mechanism / Date  None  Surgery / Date  Right TKA 24    History of Present Illness   Gurdeep Martin is a 59 y.o. male who presents for today 4 mos s/p right TKA.  Patient report overall the right knee is doing well.  Patient would like to schedule his left TKA.      Treatment Summary  Medications / Modalities  None  Bracing / Immobilization  None  Physical Therapy  Completed PO PT  Injections  10/18/24  7/17/23  9/23/23  10/15/19  Prior Surgeries  Right TKA  Other Treatments  None    Employment / Current Status  [employer]    Sport / Organization / Current Status  N/A      Review of Systems  Pertinent items are noted in HPI.  All other systems were reviewed and are negative.      Physical Exam  /75   Pulse 59   Ht 5' 8\" (1.727 m)   Wt 102 kg (225 lb 9.6 oz)   BMI 34.30 kg/m²   Cons: Appears well.  No apparent distress.  Psych: Alert. Oriented x3.  Mood and affect normal.  Eyes: PERRLA, EOMI  Resp: Normal effort.  No audible wheezing or stridor.  CV: Palpable pulse.  No discernable arrhythmia.  No LE edema.  Lymph:  No palpable cervical, axillary, or inguinal lymphadenopathy.  Skin: Warm.  No palpable masses.  No visible lesions.  Neuro: Normal muscle tone.  Normal and symmetric DTR's.     Right Knee Exam  Alignment:  Normal knee alignment.  Inspection:  No swelling. No edema. No erythema. Incision healed.  Palpation:  No " tenderness.  ROM:  Knee Extension 0. Knee Flexion 130.  Strength:  5/5 quadriceps and hamstrings.  Stability:  No objective knee instability. Stable Varus / Valgus stress, Lachman, and Posterior drawer.  Tests:  No pertinent positive or negative tests.  Patella:  Normal patellar mobility.  Neurovascular:  Sensation intact in DP/SP/Wong/Sa/T nerve distributions.  2+ DP & PT pulses.  Gait:  Steady.     Left Knee Exam  Alignment:  Normal knee alignment.  Inspection:  No swelling. No edema. No erythema.  Palpation:   medial joint line tenderness.  ROM:  Knee Extension 0. Knee Flexion 120.  Strength:  5/5 quadriceps and hamstrings.  Stability:  No objective knee instability. Stable Varus / Valgus stress, Lachman, and Posterior drawer.  Tests:  No pertinent positive or negative tests.  Patella:  Patella tracks centrally with crepitus.  Neurovascular:  Sensation intact in DP/SP/Wong/Sa/T nerve distributions.  2+ DP & PT pulses.  Gait:  Steady.       Studies Reviewed  XR of left knee - tricompartmental degenerative changes, advanced over the medial joint line and PF.       Procedures  No procedures today.    Medical, Surgical, Family, and Social History  The patient's medical history, family history, and social history, were reviewed and updated as appropriate.    Past Medical History:   Diagnosis Date    Arthritis     Gout     Hyperlipidemia     Hypertension        Past Surgical History:   Procedure Laterality Date    CARPAL TUNNEL RELEASE Bilateral     COLONOSCOPY      NEUROMA EXCISION Left     Foot    DE ARTHRP KNE CONDYLE&PLATU MEDIAL&LAT COMPARTMENTS Right 2/22/2024    Procedure: ARTHROPLASTY KNEE TOTAL, same day d/c;  Surgeon: Migue Martin MD;  Location: [facility] OR;  Service: Orthopedics       History reviewed. No pertinent family history.    Social History     Occupational History    Not on file   Tobacco Use    Smoking status: Never    Smokeless tobacco: Never   Vaping Use    Vaping status: Never Used    Substance and Sexual Activity    Alcohol use: Yes     Alcohol/week: 3.0 standard drinks of alcohol     Types: 3 Standard drinks or equivalent per week    Drug use: Never    Sexual activity: Not on file       No Known Allergies      Current Outpatient Medications:     atorvastatin (LIPITOR) 20 mg tablet, , Disp: , Rfl:     metoprolol succinate (TOPROL-XL) 50 mg 24 hr tablet, Take 50 mg by mouth daily, Disp: , Rfl:     Multiple Vitamin (multivitamin) tablet, Take 1 tablet by mouth daily, Disp: 30 tablet, Rfl: 1    ascorbic Acid (VITAMIN C) 500 MG CPCR, Take 1 capsule (500 mg total) by mouth daily (Patient not taking: Reported on 4/8/2024), Disp: 30 capsule, Rfl: 0    aspirin (ECOTRIN LOW STRENGTH) 81 mg EC tablet, Take 1 tablet (81 mg total) by mouth 2 (two) times a day, Disp: 60 tablet, Rfl: 0    Cholecalciferol 25 MCG (1000 UT) tablet, Take 1,000 Units by mouth daily (Patient not taking: Reported on 4/1/2024), Disp: , Rfl:     ferrous sulfate 324 (65 Fe) mg, Take 1 tablet (324 mg total) by mouth 2 (two) times a day before meals (Patient not taking: Reported on 3/8/2024), Disp: 60 tablet, Rfl: 0    folic acid (FOLVITE) 1 mg tablet, Take 1 tablet (1 mg total) by mouth daily (Patient not taking: Reported on 3/8/2024), Disp: 30 tablet, Rfl: 0    lisinopril-hydrochlorothiazide (PRINZIDE,ZESTORETIC) 20-12.5 MG per tablet, Take 1 tablet by mouth daily, Disp: , Rfl:     Magnesium 80 MG TABS, Take by mouth (Patient not taking: Reported on 4/1/2024), Disp: , Rfl:     meloxicam (MOBIC) 15 mg tablet, , Disp: , Rfl:     ondansetron (ZOFRAN-ODT) 4 mg disintegrating tablet, Take 1 tablet (4 mg total) by mouth every 8 (eight) hours as needed for nausea or vomiting (Patient not taking: Reported on 3/8/2024), Disp: 15 tablet, Rfl: 0      Tierra Lazo    Scribe Attestation      I,:  Tierra Lazo am acting as a scribe while in the presence of the attending physician.:       I,:  Migue Martin MD personally performed the  services described in this documentation    as scribed in my presence.:

## 2024-07-15 ENCOUNTER — TELEPHONE (OUTPATIENT)
Dept: OBGYN CLINIC | Facility: MEDICAL CENTER | Age: 59
End: 2024-07-15

## 2024-07-15 NOTE — TELEPHONE ENCOUNTER
Caller: Gurdeep    Doctor: Dr Martin     Reason for call: The patient would like to change his appointment with Sheeba Ludwig on 8/22 at 3:00 to Wed 8/21 or Monday 8/26 if possible. Thank you     Call back#: [phone]    no

## 2024-07-15 NOTE — TELEPHONE ENCOUNTER
LVM for the patient that I changed his appointment to 8/21 at 3:00. Left my direct callback number and let him know that he can call me to change the appointment if it does not work for him.

## 2024-07-29 ENCOUNTER — TELEPHONE (OUTPATIENT)
Dept: OBGYN CLINIC | Facility: HOSPITAL | Age: 59
End: 2024-07-29

## 2024-07-30 ENCOUNTER — TELEPHONE (OUTPATIENT)
Dept: OBGYN CLINIC | Facility: HOSPITAL | Age: 59
End: 2024-07-30

## 2024-07-30 NOTE — TELEPHONE ENCOUNTER
Preoperative Elective Admission Assessment, confirmed prior assessment 1/16/24    Living Situation:    Who does pt live with: spouse  What kind of home: multi-level  How do they enter the home: front  How many levels in home: 2   # of steps to enter home: 5  # of steps to second floor: 12-14  Are there handrails: Yes  Are there landings: No  Sleeping arrangement: first/entry floor  Where is Bathroom: first floor   Where is the tub or shower: first floor w/ walk in shower and grab bar      First Floor Setup:   Is there a bathroom: Yes  Where would pt sleep: couch, bed, and recliner     DME:  Pt confirms he has cane and walker from prior surgery. No DME ordered. Instructed pt to bring RW on DOS, verbalizes understanding.     We discussed clearing pathways in the home and making sure there is accessibly to use the walker, for example, removing throw rugs.      Patient's Current Level of Function: Ambulates: Independently and ADLs: Independent    Post-op Caregiver: spouse  Caregiver Name and phone number for Inpatient discharge needs: Charisma Yadav (Spouse)  [phone]   Currently receive any HHC/aides/community supports: No     Post-op Transport: spouse  To/from hospital: spouse  To/from PT 2-3x/week: spouse  Uses community transport now: No     Outpatient Physical Therapy Site:  Site: [place]   pre and post-op appts scheduled? No     Medication Management: self  Preferred Pharmacy for Post-op Medications: [facility]  [place] [facility] [address], [[id number]]   Blood Management Vitamin Regimen:  Pt confirms he has preop BM vitamins at home and will begin prior as instructed   Post-op anticoagulant: to be determined by surgical team postoperatively     DC Plan: Pt plans to be discharged home    Barriers to DC identified preoperatively: none identified    BMI: 34.30    Patient Education:  Pt educated on post-op pain, early mobilization (POD0), LOS goals, OP PT goals, and preoperative  bathing. Patient educated that our goal is to appropriately discharge patient based off their post-op function while striving to maintain maximal independence. The goal is to discharge patient to home and for them to attend outpatient physical therapy.    Assigned to care team? Yes

## 2024-08-12 NOTE — PRE-PROCEDURE INSTRUCTIONS
Pre-Surgery Instructions:   Medication Instructions    ascorbic Acid (VITAMIN C) 500 MG CPCR Hold day of surgery.    atorvastatin (LIPITOR) 20 mg tablet Take day of surgery.    Cholecalciferol 25 MCG (1000 UT) tablet Stop taking 7 days prior to surgery.    ferrous sulfate 324 (65 Fe) mg Hold day of surgery.    folic acid (FOLVITE) 1 mg tablet Hold day of surgery.    lisinopril-hydrochlorothiazide (PRINZIDE,ZESTORETIC) 20-12.5 MG per tablet Hold day of surgery.    metoprolol succinate (TOPROL-XL) 50 mg 24 hr tablet Take day of surgery.    Multiple Vitamin (multivitamin) tablet Hold day of surgery.   Ortho class completed.    Medication instructions for day surgery reviewed. Please use only a sip of water to take your instructed medications. Avoid all over the counter vitamins, supplements and NSAIDS for one week prior to surgery per anesthesia guidelines. Tylenol is ok to take as needed.     You will receive a call one business day prior to surgery with an arrival time and hospital directions. If your surgery is scheduled on a Monday, the hospital will be calling you on the Friday prior to your surgery. If you have not heard from anyone by 8pm, please call the hospital supervisor through the hospital  at [phone]. ([place] [phone] or [place] [phone]).    Do not eat or drink anything after midnight the night before your surgery, including candy, mints, lifesavers, or chewing gum. Do not drink alcohol 24hrs before your surgery. Try not to smoke at least 24hrs before your surgery.       Follow the pre surgery showering instructions as listed in the “My Surgical Experience Booklet” or otherwise provided by your surgeon's office. Do not use a blade to shave the surgical area 1 week before surgery. It is okay to use a clean electric clippers up to 24 hours before surgery. Do not apply any lotions, creams, including makeup, cologne, deodorant, or perfumes after showering on the day of your surgery.  Do not use dry shampoo, hair spray, hair gel, or any type of hair products.     No contact lenses, eye make-up, or artificial eyelashes. Remove nail polish, including gel polish, and any artificial, gel, or acrylic nails if possible. Remove all jewelry including rings and body piercing jewelry.     Wear causal clothing that is easy to take on and off. Consider your type of surgery.    Keep any valuables, jewelry, piercings at home. Please bring any specially ordered equipment (sling, braces) if indicated.    Arrange for a responsible person to drive you to and from the hospital on the day of your surgery. Please confirm the visitor policy for the day of your procedure when you receive your phone call with an arrival time.     Call the surgeon's office with any new illnesses, exposures, or additional questions prior to surgery.    Please reference your “My Surgical Experience Booklet” for additional information to prepare for your upcoming surgery.

## 2024-08-13 PROBLEM — M17.12 PRIMARY OSTEOARTHRITIS OF LEFT KNEE: Status: ACTIVE | Noted: 2024-08-13

## 2024-08-13 NOTE — PROGRESS NOTES
Internal Medicine Pre-Operative Evaluation:     Reason for Visit: Pre-operative Evaluation for Risk Stratification and Optimization    Patient ID: Gurdeep Martin is a 59 y.o. male.     Surgery: Arthroplasty of left knee  Referring Provider: Dr. Martin      Recommendations to Proceed withSurgery    Patient is considered to be Low risk for Medium risk procedure.     After evaluation and discussion with patient with emphasis that all surgery has some degree of inherent risk it is acknowledged by patient this risk is Acceptable.    Patient is optimized and may proceed with planned procedure.     Assessment    Pre-operative Medical Evaluation for planned surgery  Recommendations as listed in PLAN section below  Contact surgical nurse  navigator with any questions regarding preoperative plan or schedule.      Problem List Items Addressed This Visit          Cardiovascular and Mediastinum    Benign essential hypertension     Stable  Monitor post operative BP   Avoid hypotension if at all possible  Refer to PAT instructions regarding medication administration the morning of surgery              Musculoskeletal and Integument    Primary osteoarthritis of left knee     Failed outpatient conservative measures  Electing to undergo arthroplasty              Surgery/Wound/Pain    Status post total right knee replacement not using cement     2/2024  No post operative issues            Other    Obesity, unspecified     Recommend ongoing attempts at weight loss  Current BMI meets criteria of <40 per [facility] preoperative qualifications            Other Visit Diagnoses       Preoperative clearance    -  Primary                 Plan:     1. Further preoperative workup as follows:   - none no further testing required may proceed with surgery    2. Preoperative Medication Management Review performed by PAT nursing  YES    3. Patient requires further consultation with:   No Consults Required    4. Discharge Planning / Barriers to  "Discharge  none identified - patients has post discharge therapy plan in place, transportation arranged for discharge day, adequate family support at home to assist with discharge to home.        Subjective:           History of Present Illness:     Gurdeep Martin is a 59 y.o. male who presents to the office today for a preoperative consultation at the request of surgeon. The patient understands this is an elective procedure and not emergent. They are electing to undergo planned procedure with an understanding that all surgery has inherent risk. They have worked with their surgeon and failed conservative treatment measures. Today they present for preoperative risk assessment and recommendations for optimization in preparation for surgery.    Pt had recent R TKA in February 2024 without any issues.     Pt seen in surgical optimization center for upcoming proposed surgery. They have failed previous conservative measures and have elected surgical intervention.     Pt meets presurgical lab and BMI optimization goals.    Upon interview questioning patient is able to perform greater than 4 METs workload in daily life without any reported cardio-pulmonary symptoms.          ROS: No TIA's or unusual headaches, no dysphagia.  No prolonged cough. No dyspnea or chest pain on exertion.  No abdominal pain, change in bowel habits, black or bloody stools.  No urinary tract or BPH symptoms.  Positive reported pain in arthritic joint. Positive difficulty with gait. No skin rashes or issues.      Objective:    /78   Pulse 72   Ht 5' 8\" (1.727 m)   Wt 108 kg (238 lb)   BMI 36.19 kg/m²       General Appearance: no distress, conversive  HEENT: PERRLA, conjuctiva normal; oropharynx clear; mucous membranes moist;   Neck:  Supple, no lymphadenopathy or thyromegaly  Lungs: breath sounds normal, normal respiratory effort, no retractions, expiratory effort normal  CV: normal heart sounds S1/S2, PMI normal   ABD: soft non tender, " no masses , no hepatic or splenomegaly  EXT: DP pulses intact, no lymphadenopathy, no edema  Skin: normal turgor, normal texture, no rash  Psych: affect normal, mood normal  Neuro: AAOx3        The following portions of the patient's history were reviewed and updated as appropriate: allergies, current medications, past family history, past medical history, past social history, past surgical history and problem list.     Past History:       Past Medical History:   Diagnosis Date    Arthritis     Gout     Hyperlipidemia     Hypertension     Past Surgical History:   Procedure Laterality Date    CARPAL TUNNEL RELEASE Bilateral     COLONOSCOPY      NEUROMA EXCISION Left     Foot    [place] ARTHRP KNE CONDYLE&PLATU MEDIAL&LAT COMPARTMENTS Right 2/22/2024    Procedure: ARTHROPLASTY KNEE TOTAL, same day d/c;  Surgeon: Migue Martin MD;  Location: [facility];  Service: Orthopedics          Social History     Tobacco Use    Smoking status: Never    Smokeless tobacco: Never   Vaping Use    Vaping status: Never Used   Substance Use Topics    Alcohol use: Yes     Alcohol/week: 3.0 standard drinks of alcohol     Types: 3 Standard drinks or equivalent per week    Drug use: Never     No family history on file.       Allergies:     No Known Allergies     Current Medications:     Current Outpatient Medications   Medication Instructions    ascorbic Acid (VITAMIN C) 500 mg, Oral, Daily    aspirin (ECOTRIN LOW STRENGTH) 81 mg, Oral, 2 times daily    atorvastatin (LIPITOR) 20 mg, Oral, Daily    Cholecalciferol (VITAMIN D3) 1,000 Units, Oral, Daily    ferrous sulfate 324 mg, Oral, 2 times daily before meals    folic acid (FOLVITE) 1 mg, Oral, Daily    lisinopril-hydrochlorothiazide (PRINZIDE,ZESTORETIC) 20-12.5 MG per tablet 1 tablet, Oral, Daily    metoprolol succinate (TOPROL-XL) 50 mg, Oral, Daily    Multiple Vitamin (multivitamin) tablet 1 tablet, Oral, Daily           PRE-OP WORKSHEET DATA    Assessment of Pre-Operative Risks  "    MLJ Quality Hard Stops:    BMI (<40) : Estimated body mass index is 36.19 kg/m² as calculated from the following:    Height as of this encounter: 5' 8\" (1.727 m).    Weight as of this encounter: 108 kg (238 lb).    Hgb ( >11):   Lab Results   Component Value Date    HGB 14.0 08/15/2024    HGB 14.7 02/02/2024    HGB 14.5 04/01/2021       HbA1c (<7.5) :   Lab Results   Component Value Date    HGBA1C 5.2 08/15/2024       GFR (>60) (Less then 45 = Nephrology consult):    Lab Results   Component Value Date    EGFR 94 08/15/2024    EGFR 75 02/02/2024    EGFR 88 05/17/2023         Active Decompensated Chronic Conditions which would delay surgery  No acutely decompensated medical issues such as recent CVA, MI, new onset arrhythmia, severe aortic stenosis, CHF, uncontrolled COPD       Functional capacity: PUSH MOWING LAWN, BRISK WALKING                                   5 METS  Pick the highest level patient can comfortably perform   (if less the 4 mets consider functional assessment via cardiac stress testing or consultation)    Assessment of intra and post operative respiratory, hemodynamic and thrombotic risks     Prior Anesthesia Reactions: No     Personal history of venous thromboembolic disease? No    History of steroid use > 5 mg for >2 weeks within last year? No      The patient's risk factors for cardiac complications include :  none    Gurdeep Martin has an IN HOSPITAL cardiac risk of RCI RISK CLASS I (0 risk factors, risk of major cardiac compl. appr. 0.5%) based on RCRI calculator    Cardiac Risk Estimation: per the Revised Cardiac Risk Index (Circ. 100:1043, 1999),        Pre-Op Data Reviewed:       Laboratory Results: I have personally reviewed the pertinent laboratory results/reports     EKG:I have personally reviewed pertinent reports.  . I personally reviewed and interpreted available tracings in the electronic medical record    Sinus bradycardia  Cannot rule out Anterior infarct , age " undetermined  Abnormal ECG  When compared with ECG of 28-JUN-2012 12:41,  No significant change was found  Confirmed by Anderson Harper ([id number]) on 2/3/2024 8:19:20 AM      Specimen Collected: 02/02/24          OLD RECORDS: reviewed old records in the chart review section if EHR on day of visit.    Previous cardiopulmonary studies within the past year:  Echocardiogram: no   Cardiac Catheterization: no  Stress Test: no      Time of visit including pre-visit chart review, visit and post-visit coordination of plan and care , review of pre-surgical lab work, preparation and time spent documenting note in electronic medical record, time spent face-to-face in physical examination answering patient questions by care team 35 minutes             [facility]

## 2024-08-13 NOTE — H&P (VIEW-ONLY)
Internal Medicine Pre-Operative Evaluation:     Reason for Visit: Pre-operative Evaluation for Risk Stratification and Optimization    Patient ID: Gurdeep Martin is a 59 y.o. male.     Surgery: Arthroplasty of left knee  Referring Provider: Dr. Martin      Recommendations to Proceed withSurgery    Patient is considered to be Low risk for Medium risk procedure.     After evaluation and discussion with patient with emphasis that all surgery has some degree of inherent risk it is acknowledged by patient this risk is Acceptable.    Patient is optimized and may proceed with planned procedure.     Assessment    Pre-operative Medical Evaluation for planned surgery  Recommendations as listed in PLAN section below  Contact surgical nurse  navigator with any questions regarding preoperative plan or schedule.      Problem List Items Addressed This Visit          Cardiovascular and Mediastinum    Benign essential hypertension     Stable  Monitor post operative BP   Avoid hypotension if at all possible  Refer to PAT instructions regarding medication administration the morning of surgery              Musculoskeletal and Integument    Primary osteoarthritis of left knee     Failed outpatient conservative measures  Electing to undergo arthroplasty              Surgery/Wound/Pain    Status post total right knee replacement not using cement     2/2024  No post operative issues            Other    Obesity, unspecified     Recommend ongoing attempts at weight loss  Current BMI meets criteria of <40 per [facility] preoperative qualifications            Other Visit Diagnoses       Preoperative clearance    -  Primary                 Plan:     1. Further preoperative workup as follows:   - none no further testing required may proceed with surgery    2. Preoperative Medication Management Review performed by PAT nursing  YES    3. Patient requires further consultation with:   No Consults Required    4. Discharge Planning / Barriers to  "Discharge  none identified - patients has post discharge therapy plan in place, transportation arranged for discharge day, adequate family support at home to assist with discharge to home.        Subjective:           History of Present Illness:     Gurdeep Martin is a 59 y.o. male who presents to the office today for a preoperative consultation at the request of surgeon. The patient understands this is an elective procedure and not emergent. They are electing to undergo planned procedure with an understanding that all surgery has inherent risk. They have worked with their surgeon and failed conservative treatment measures. Today they present for preoperative risk assessment and recommendations for optimization in preparation for surgery.    Pt had recent R TKA in February 2024 without any issues.     Pt seen in surgical optimization center for upcoming proposed surgery. They have failed previous conservative measures and have elected surgical intervention.     Pt meets presurgical lab and BMI optimization goals.    Upon interview questioning patient is able to perform greater than 4 METs workload in daily life without any reported cardio-pulmonary symptoms.          ROS: No TIA's or unusual headaches, no dysphagia.  No prolonged cough. No dyspnea or chest pain on exertion.  No abdominal pain, change in bowel habits, black or bloody stools.  No urinary tract or BPH symptoms.  Positive reported pain in arthritic joint. Positive difficulty with gait. No skin rashes or issues.      Objective:    /78   Pulse 72   Ht 5' 8\" (1.727 m)   Wt 108 kg (238 lb)   BMI 36.19 kg/m²       General Appearance: no distress, conversive  HEENT: PERRLA, conjuctiva normal; oropharynx clear; mucous membranes moist;   Neck:  Supple, no lymphadenopathy or thyromegaly  Lungs: breath sounds normal, normal respiratory effort, no retractions, expiratory effort normal  CV: normal heart sounds S1/S2, PMI normal   ABD: soft non tender, " no masses , no hepatic or splenomegaly  EXT: DP pulses intact, no lymphadenopathy, no edema  Skin: normal turgor, normal texture, no rash  Psych: affect normal, mood normal  Neuro: AAOx3        The following portions of the patient's history were reviewed and updated as appropriate: allergies, current medications, past family history, past medical history, past social history, past surgical history and problem list.     Past History:       Past Medical History:   Diagnosis Date    Arthritis     Gout     Hyperlipidemia     Hypertension     Past Surgical History:   Procedure Laterality Date    CARPAL TUNNEL RELEASE Bilateral     COLONOSCOPY      NEUROMA EXCISION Left     Foot    VT ARTHRP KNE CONDYLE&PLATU MEDIAL&LAT COMPARTMENTS Right 2/22/2024    Procedure: ARTHROPLASTY KNEE TOTAL, same day d/c;  Surgeon: Migue Martin MD;  Location: [facility];  Service: Orthopedics          Social History     Tobacco Use    Smoking status: Never    Smokeless tobacco: Never   Vaping Use    Vaping status: Never Used   Substance Use Topics    Alcohol use: Yes     Alcohol/week: 3.0 standard drinks of alcohol     Types: 3 Standard drinks or equivalent per week    Drug use: Never     No family history on file.       Allergies:     No Known Allergies     Current Medications:     Current Outpatient Medications   Medication Instructions    ascorbic Acid (VITAMIN C) 500 mg, Oral, Daily    aspirin (ECOTRIN LOW STRENGTH) 81 mg, Oral, 2 times daily    atorvastatin (LIPITOR) 20 mg, Oral, Daily    Cholecalciferol (VITAMIN D3) 1,000 Units, Oral, Daily    ferrous sulfate 324 mg, Oral, 2 times daily before meals    folic acid (FOLVITE) 1 mg, Oral, Daily    lisinopril-hydrochlorothiazide (PRINZIDE,ZESTORETIC) 20-12.5 MG per tablet 1 tablet, Oral, Daily    metoprolol succinate (TOPROL-XL) 50 mg, Oral, Daily    Multiple Vitamin (multivitamin) tablet 1 tablet, Oral, Daily           PRE-OP WORKSHEET DATA    Assessment of Pre-Operative Risks  "    MLJ Quality Hard Stops:    BMI (<40) : Estimated body mass index is 36.19 kg/m² as calculated from the following:    Height as of this encounter: 5' 8\" (1.727 m).    Weight as of this encounter: 108 kg (238 lb).    Hgb ( >11):   Lab Results   Component Value Date    HGB 14.0 08/15/2024    HGB 14.7 02/02/2024    HGB 14.5 04/01/2021       HbA1c (<7.5) :   Lab Results   Component Value Date    HGBA1C 5.2 08/15/2024       GFR (>60) (Less then 45 = Nephrology consult):    Lab Results   Component Value Date    EGFR 94 08/15/2024    EGFR 75 02/02/2024    EGFR 88 05/17/2023         Active Decompensated Chronic Conditions which would delay surgery  No acutely decompensated medical issues such as recent CVA, MI, new onset arrhythmia, severe aortic stenosis, CHF, uncontrolled COPD       Functional capacity: PUSH MOWING LAWN, BRISK WALKING                                   5 METS  Pick the highest level patient can comfortably perform   (if less the 4 mets consider functional assessment via cardiac stress testing or consultation)    Assessment of intra and post operative respiratory, hemodynamic and thrombotic risks     Prior Anesthesia Reactions: No     Personal history of venous thromboembolic disease? No    History of steroid use > 5 mg for >2 weeks within last year? No      The patient's risk factors for cardiac complications include :  none    Gurdeep Martin has an IN HOSPITAL cardiac risk of RCI RISK CLASS I (0 risk factors, risk of major cardiac compl. appr. 0.5%) based on RCRI calculator    Cardiac Risk Estimation: per the Revised Cardiac Risk Index (Circ. 100:1043, 1999),        Pre-Op Data Reviewed:       Laboratory Results: I have personally reviewed the pertinent laboratory results/reports     EKG:I have personally reviewed pertinent reports.  . I personally reviewed and interpreted available tracings in the electronic medical record    Sinus bradycardia  Cannot rule out Anterior infarct , age " undetermined  Abnormal ECG  When compared with ECG of 28-JUN-2012 12:41,  No significant change was found  Confirmed by Anderson Harper ([id number]) on 2/3/2024 8:19:20 AM      Specimen Collected: 02/02/24          OLD RECORDS: reviewed old records in the chart review section if EHR on day of visit.    Previous cardiopulmonary studies within the past year:  Echocardiogram: no   Cardiac Catheterization: no  Stress Test: no      Time of visit including pre-visit chart review, visit and post-visit coordination of plan and care , review of pre-surgical lab work, preparation and time spent documenting note in electronic medical record, time spent face-to-face in physical examination answering patient questions by care team 35 minutes             [facility]

## 2024-08-13 NOTE — PATIENT INSTRUCTIONS
BEFORE SURGERY    Contact surgical nurse  navigator with any questions regarding preoperative plan or schedule.  Stop all over the counter supplements, herbal, naturopathic  medications for 1 week prior to surgery UNLESS prescribed by your surgeon  Hold NSAIDS (i.e. advil, alleve, motrin, ibuprofen, celebrex) minimum 5 days prior to surgery  Follow presurgical medication instructions provided by preadmission nursing team reviewed during your presurgery phone call  Strategies for optimizing your surgery through breathing exercises, nutrition and physical activity can be found at [email or web address]  Call [phone] with any presurgical concerns or medications questions    AFTER SURGERY    Recommend using Tylenol ( acetaminophen ) 1000 mg every eight hours during the first week post discharge along with icing the area for 20 mins every 3-4 hours while awake can be helpful in reducing your need for post operative opioid use. This opioid sparing plan can be used along side your surgeons pain plan.  Use stool softener over the counter (colace) daily after surgery during the first 1-2 weeks to avoid post operative constipation issues  If no bowel movement within 3 days after surgery then use over the counter Miralax in addition to your stool softener   If cleared by your surgical team for activity then early and often walking is encouraged and can be important in prevention of post surgical blood clots. Additionally spend as much time out of bed as possible and allowed by your surgical team  Use your incentive spirometer twice per hour in the first seven days after surgery to help prevent post surgery lung complications and infections  Call [phone] with any post discharge concerns or medical issues

## 2024-08-15 ENCOUNTER — OFFICE VISIT (OUTPATIENT)
Dept: LAB | Facility: HOSPITAL | Age: 59
End: 2024-08-15
Payer: COMMERCIAL

## 2024-08-15 ENCOUNTER — APPOINTMENT (OUTPATIENT)
Dept: LAB | Facility: HOSPITAL | Age: 59
End: 2024-08-15
Payer: COMMERCIAL

## 2024-08-15 ENCOUNTER — TELEPHONE (OUTPATIENT)
Age: 59
End: 2024-08-15

## 2024-08-15 DIAGNOSIS — Z01.818 OTHER SPECIFIED PRE-OPERATIVE EXAMINATION: ICD-10-CM

## 2024-08-15 DIAGNOSIS — Z01.818 ENCOUNTER FOR PREADMISSION TESTING: Primary | ICD-10-CM

## 2024-08-15 DIAGNOSIS — M17.12 PRIMARY OSTEOARTHRITIS OF LEFT KNEE: ICD-10-CM

## 2024-08-15 LAB
ALBUMIN SERPL BCG-MCNC: 4.4 G/DL (ref 3.5–5)
ALP SERPL-CCNC: 61 U/L (ref 34–104)
ALT SERPL W P-5'-P-CCNC: 17 U/L (ref 7–52)
ANION GAP SERPL CALCULATED.3IONS-SCNC: 7 MMOL/L (ref 4–13)
APTT PPP: 29 SECONDS (ref 23–34)
AST SERPL W P-5'-P-CCNC: 15 U/L (ref 13–39)
BASOPHILS # BLD AUTO: 0.09 THOUSANDS/ÂΜL (ref 0–0.1)
BASOPHILS NFR BLD AUTO: 2 % (ref 0–1)
BILIRUB SERPL-MCNC: 0.56 MG/DL (ref 0.2–1)
BUN SERPL-MCNC: 23 MG/DL (ref 5–25)
CALCIUM SERPL-MCNC: 10 MG/DL (ref 8.4–10.2)
CHLORIDE SERPL-SCNC: 105 MMOL/L (ref 96–108)
CO2 SERPL-SCNC: 28 MMOL/L (ref 21–32)
CREAT SERPL-MCNC: 0.87 MG/DL (ref 0.6–1.3)
EOSINOPHIL # BLD AUTO: 0.31 THOUSAND/ÂΜL (ref 0–0.61)
EOSINOPHIL NFR BLD AUTO: 5 % (ref 0–6)
ERYTHROCYTE [DISTWIDTH] IN BLOOD BY AUTOMATED COUNT: 12.2 % (ref 11.6–15.1)
EST. AVERAGE GLUCOSE BLD GHB EST-MCNC: 103 MG/DL
GFR SERPL CREATININE-BSD FRML MDRD: 94 ML/MIN/1.73SQ M
GLUCOSE P FAST SERPL-MCNC: 90 MG/DL (ref 65–99)
HBA1C MFR BLD: 5.2 %
HCT VFR BLD AUTO: 42.3 % (ref 36.5–49.3)
HGB BLD-MCNC: 14 G/DL (ref 12–17)
IMM GRANULOCYTES # BLD AUTO: 0.03 THOUSAND/UL (ref 0–0.2)
IMM GRANULOCYTES NFR BLD AUTO: 1 % (ref 0–2)
INR PPP: 0.99 (ref 0.85–1.19)
LYMPHOCYTES # BLD AUTO: 2.2 THOUSANDS/ÂΜL (ref 0.6–4.47)
LYMPHOCYTES NFR BLD AUTO: 37 % (ref 14–44)
MCH RBC QN AUTO: 31.8 PG (ref 26.8–34.3)
MCHC RBC AUTO-ENTMCNC: 33.1 G/DL (ref 31.4–37.4)
MCV RBC AUTO: 96 FL (ref 82–98)
MONOCYTES # BLD AUTO: 0.78 THOUSAND/ÂΜL (ref 0.17–1.22)
MONOCYTES NFR BLD AUTO: 13 % (ref 4–12)
NEUTROPHILS # BLD AUTO: 2.57 THOUSANDS/ÂΜL (ref 1.85–7.62)
NEUTS SEG NFR BLD AUTO: 42 % (ref 43–75)
NRBC BLD AUTO-RTO: 0 /100 WBCS
PLATELET # BLD AUTO: 233 THOUSANDS/UL (ref 149–390)
PMV BLD AUTO: 9.4 FL (ref 8.9–12.7)
POTASSIUM SERPL-SCNC: 5.1 MMOL/L (ref 3.5–5.3)
PROT SERPL-MCNC: 7.6 G/DL (ref 6.4–8.4)
PROTHROMBIN TIME: 13.6 SECONDS (ref 12.3–15)
RBC # BLD AUTO: 4.4 MILLION/UL (ref 3.88–5.62)
SODIUM SERPL-SCNC: 140 MMOL/L (ref 135–147)
WBC # BLD AUTO: 5.98 THOUSAND/UL (ref 4.31–10.16)

## 2024-08-15 PROCEDURE — 80053 COMPREHEN METABOLIC PANEL: CPT

## 2024-08-15 PROCEDURE — 86901 BLOOD TYPING SEROLOGIC RH(D): CPT | Performed by: ORTHOPAEDIC SURGERY

## 2024-08-15 PROCEDURE — 86900 BLOOD TYPING SEROLOGIC ABO: CPT | Performed by: ORTHOPAEDIC SURGERY

## 2024-08-15 PROCEDURE — 85610 PROTHROMBIN TIME: CPT

## 2024-08-15 PROCEDURE — 85730 THROMBOPLASTIN TIME PARTIAL: CPT

## 2024-08-15 PROCEDURE — 36415 COLL VENOUS BLD VENIPUNCTURE: CPT

## 2024-08-15 PROCEDURE — 86850 RBC ANTIBODY SCREEN: CPT | Performed by: ORTHOPAEDIC SURGERY

## 2024-08-15 PROCEDURE — 83036 HEMOGLOBIN GLYCOSYLATED A1C: CPT

## 2024-08-15 PROCEDURE — 85025 COMPLETE CBC W/AUTO DIFF WBC: CPT

## 2024-08-15 PROCEDURE — 93005 ELECTROCARDIOGRAM TRACING: CPT

## 2024-08-15 NOTE — TELEPHONE ENCOUNTER
Caller: Judie Morin     Doctor: Mario     Reason for call: Asked if patient will need an EKG, I spoke to the nurse and relayed the message that yes the patient will need an EKG. Please place an order for EKG, patient is in office now

## 2024-08-15 NOTE — TELEPHONE ENCOUNTER
Caller: Registration- [facility]    Reason for call: I advised order is in for the ECG she cannot open it on her end does not have cardiology tab asking for it to be printed and faxed to [phone]    Call back#: [phone]

## 2024-08-16 ENCOUNTER — LAB REQUISITION (OUTPATIENT)
Dept: LAB | Facility: HOSPITAL | Age: 59
End: 2024-08-16
Payer: COMMERCIAL

## 2024-08-16 DIAGNOSIS — M17.12 UNILATERAL PRIMARY OSTEOARTHRITIS, LEFT KNEE: ICD-10-CM

## 2024-08-16 LAB
ABO GROUP BLD: NORMAL
ATRIAL RATE: 59 BPM
BLD GP AB SCN SERPL QL: NEGATIVE
P AXIS: 36 DEGREES
PR INTERVAL: 170 MS
QRS AXIS: 3 DEGREES
QRSD INTERVAL: 88 MS
QT INTERVAL: 386 MS
QTC INTERVAL: 382 MS
RH BLD: POSITIVE
SPECIMEN EXPIRATION DATE: NORMAL
T WAVE AXIS: -2 DEGREES
VENTRICULAR RATE: 59 BPM

## 2024-08-16 PROCEDURE — 93010 ELECTROCARDIOGRAM REPORT: CPT | Performed by: INTERNAL MEDICINE

## 2024-08-21 ENCOUNTER — OFFICE VISIT (OUTPATIENT)
Age: 59
End: 2024-08-21
Payer: COMMERCIAL

## 2024-08-21 VITALS
HEIGHT: 68 IN | SYSTOLIC BLOOD PRESSURE: 122 MMHG | HEART RATE: 72 BPM | WEIGHT: 238 LBS | DIASTOLIC BLOOD PRESSURE: 78 MMHG | BODY MASS INDEX: 36.07 KG/M2

## 2024-08-21 DIAGNOSIS — E66.09 CLASS 1 OBESITY DUE TO EXCESS CALORIES WITHOUT SERIOUS COMORBIDITY WITH BODY MASS INDEX (BMI) OF 34.0 TO 34.9 IN ADULT: ICD-10-CM

## 2024-08-21 DIAGNOSIS — M17.12 PRIMARY OSTEOARTHRITIS OF LEFT KNEE: ICD-10-CM

## 2024-08-21 DIAGNOSIS — I10 BENIGN ESSENTIAL HYPERTENSION: ICD-10-CM

## 2024-08-21 DIAGNOSIS — Z01.818 PREOPERATIVE CLEARANCE: Primary | ICD-10-CM

## 2024-08-21 DIAGNOSIS — Z96.651 STATUS POST TOTAL RIGHT KNEE REPLACEMENT NOT USING CEMENT: ICD-10-CM

## 2024-08-21 PROCEDURE — 99215 OFFICE O/P EST HI 40 MIN: CPT | Performed by: INTERNAL MEDICINE

## 2024-09-04 ENCOUNTER — ANESTHESIA EVENT (OUTPATIENT)
Dept: PERIOP | Facility: HOSPITAL | Age: 59
End: 2024-09-04
Payer: COMMERCIAL

## 2024-09-05 ENCOUNTER — ANESTHESIA (OUTPATIENT)
Dept: PERIOP | Facility: HOSPITAL | Age: 59
End: 2024-09-05
Payer: COMMERCIAL

## 2024-09-05 ENCOUNTER — HOSPITAL ENCOUNTER (OUTPATIENT)
Facility: HOSPITAL | Age: 59
Setting detail: OUTPATIENT SURGERY
Discharge: HOME | End: 2024-09-05
Attending: ORTHOPAEDIC SURGERY | Admitting: ORTHOPAEDIC SURGERY
Payer: COMMERCIAL

## 2024-09-05 VITALS
WEIGHT: 238.76 LBS | SYSTOLIC BLOOD PRESSURE: 143 MMHG | BODY MASS INDEX: 36.19 KG/M2 | OXYGEN SATURATION: 99 % | TEMPERATURE: 97.5 F | DIASTOLIC BLOOD PRESSURE: 77 MMHG | HEART RATE: 66 BPM | RESPIRATION RATE: 20 BRPM | HEIGHT: 68 IN

## 2024-09-05 DIAGNOSIS — M17.12 PRIMARY OSTEOARTHRITIS OF LEFT KNEE: Primary | ICD-10-CM

## 2024-09-05 PROCEDURE — C9290 INJ, BUPIVACAINE LIPOSOME: HCPCS | Performed by: ANESTHESIOLOGY

## 2024-09-05 PROCEDURE — 97166 OT EVAL MOD COMPLEX 45 MIN: CPT

## 2024-09-05 PROCEDURE — C1776 JOINT DEVICE (IMPLANTABLE): HCPCS | Performed by: ORTHOPAEDIC SURGERY

## 2024-09-05 PROCEDURE — C1713 ANCHOR/SCREW BN/BN,TIS/BN: HCPCS | Performed by: ORTHOPAEDIC SURGERY

## 2024-09-05 PROCEDURE — 27447 TOTAL KNEE ARTHROPLASTY: CPT | Performed by: ORTHOPAEDIC SURGERY

## 2024-09-05 PROCEDURE — 97163 PT EVAL HIGH COMPLEX 45 MIN: CPT

## 2024-09-05 DEVICE — ATTUNE KNEE SYSTEM TIBIAL BASE AFFIXIUM FIXED BEARING SIZE 6
Type: IMPLANTABLE DEVICE | Site: KNEE | Status: FUNCTIONAL
Brand: ATTUNE AFFIXIUM

## 2024-09-05 DEVICE — DEPUY CMW 2 FAST SET BONE CEMENT 20G: Type: IMPLANTABLE DEVICE | Site: KNEE | Status: FUNCTIONAL

## 2024-09-05 DEVICE — ATTUNE KNEE SYSTEM TIBIAL INSERT FIXED BEARING POSTERIOR STABILIZED 6 5MM AOX
Type: IMPLANTABLE DEVICE | Site: KNEE | Status: FUNCTIONAL
Brand: ATTUNE

## 2024-09-05 DEVICE — ATTUNE PATELLA MEDIALIZED DOME 38MM CEMENTED AOX
Type: IMPLANTABLE DEVICE | Site: KNEE | Status: FUNCTIONAL
Brand: ATTUNE

## 2024-09-05 DEVICE — ATTUNE FEMORAL POROCOAT POSTERIOR STABILIZED SIZE 6 LEFT CEMENTLESS
Type: IMPLANTABLE DEVICE | Site: KNEE | Status: FUNCTIONAL
Brand: ATTUNE

## 2024-09-05 RX ORDER — ACETAMINOPHEN 325 MG/1
650 TABLET ORAL EVERY 6 HOURS SCHEDULED
Status: DISCONTINUED | OUTPATIENT
Start: 2024-09-05 | End: 2024-09-05 | Stop reason: HOSPADM

## 2024-09-05 RX ORDER — ONDANSETRON 2 MG/ML
4 INJECTION INTRAMUSCULAR; INTRAVENOUS EVERY 6 HOURS PRN
Status: DISCONTINUED | OUTPATIENT
Start: 2024-09-05 | End: 2024-09-05 | Stop reason: HOSPADM

## 2024-09-05 RX ORDER — TRANEXAMIC ACID 10 MG/ML
INJECTION, SOLUTION INTRAVENOUS AS NEEDED
Status: DISCONTINUED | OUTPATIENT
Start: 2024-09-05 | End: 2024-09-05

## 2024-09-05 RX ORDER — TRAMADOL HYDROCHLORIDE 50 MG/1
50 TABLET ORAL EVERY 6 HOURS SCHEDULED
Status: DISCONTINUED | OUTPATIENT
Start: 2024-09-05 | End: 2024-09-05 | Stop reason: HOSPADM

## 2024-09-05 RX ORDER — BUPIVACAINE HYDROCHLORIDE 2.5 MG/ML
INJECTION, SOLUTION EPIDURAL; INFILTRATION; INTRACAUDAL
Status: COMPLETED | OUTPATIENT
Start: 2024-09-05 | End: 2024-09-05

## 2024-09-05 RX ORDER — DOCUSATE SODIUM 100 MG/1
100 CAPSULE, LIQUID FILLED ORAL 2 TIMES DAILY
Status: DISCONTINUED | OUTPATIENT
Start: 2024-09-05 | End: 2024-09-05 | Stop reason: HOSPADM

## 2024-09-05 RX ORDER — PROPOFOL 10 MG/ML
INJECTION, EMULSION INTRAVENOUS AS NEEDED
Status: DISCONTINUED | OUTPATIENT
Start: 2024-09-05 | End: 2024-09-05

## 2024-09-05 RX ORDER — CEFAZOLIN SODIUM 2 G/50ML
2000 SOLUTION INTRAVENOUS EVERY 8 HOURS
Status: DISCONTINUED | OUTPATIENT
Start: 2024-09-05 | End: 2024-09-05 | Stop reason: HOSPADM

## 2024-09-05 RX ORDER — BUPIVACAINE HYDROCHLORIDE 5 MG/ML
INJECTION, SOLUTION EPIDURAL; INTRACAUDAL
Status: COMPLETED | OUTPATIENT
Start: 2024-09-05 | End: 2024-09-05

## 2024-09-05 RX ORDER — SODIUM CHLORIDE 9 MG/ML
INJECTION, SOLUTION INTRAVENOUS CONTINUOUS PRN
Status: DISCONTINUED | OUTPATIENT
Start: 2024-09-05 | End: 2024-09-05

## 2024-09-05 RX ORDER — OXYCODONE HYDROCHLORIDE 5 MG/1
5 TABLET ORAL EVERY 4 HOURS PRN
Qty: 20 TABLET | Refills: 0 | Status: SHIPPED | OUTPATIENT
Start: 2024-09-05 | End: 2024-09-15

## 2024-09-05 RX ORDER — SODIUM CHLORIDE 9 MG/ML
125 INJECTION, SOLUTION INTRAVENOUS CONTINUOUS
Status: DISCONTINUED | OUTPATIENT
Start: 2024-09-05 | End: 2024-09-05 | Stop reason: HOSPADM

## 2024-09-05 RX ORDER — FENTANYL CITRATE 50 UG/ML
INJECTION, SOLUTION INTRAMUSCULAR; INTRAVENOUS AS NEEDED
Status: DISCONTINUED | OUTPATIENT
Start: 2024-09-05 | End: 2024-09-05

## 2024-09-05 RX ORDER — CALCIUM CARBONATE 500 MG/1
1000 TABLET, CHEWABLE ORAL DAILY PRN
Status: DISCONTINUED | OUTPATIENT
Start: 2024-09-05 | End: 2024-09-05 | Stop reason: HOSPADM

## 2024-09-05 RX ORDER — LIDOCAINE HYDROCHLORIDE 10 MG/ML
INJECTION, SOLUTION EPIDURAL; INFILTRATION; INTRACAUDAL; PERINEURAL AS NEEDED
Status: DISCONTINUED | OUTPATIENT
Start: 2024-09-05 | End: 2024-09-05

## 2024-09-05 RX ORDER — SENNOSIDES 8.6 MG
1 TABLET ORAL DAILY
Status: DISCONTINUED | OUTPATIENT
Start: 2024-09-05 | End: 2024-09-05 | Stop reason: HOSPADM

## 2024-09-05 RX ORDER — NAPROXEN 500 MG/1
500 TABLET ORAL 2 TIMES DAILY WITH MEALS
Qty: 60 TABLET | Refills: 0 | Status: SHIPPED | OUTPATIENT
Start: 2024-09-05

## 2024-09-05 RX ORDER — FOLIC ACID 1 MG/1
1 TABLET ORAL DAILY
Status: DISCONTINUED | OUTPATIENT
Start: 2024-09-05 | End: 2024-09-05 | Stop reason: HOSPADM

## 2024-09-05 RX ORDER — ACETAMINOPHEN 325 MG/1
650 TABLET ORAL EVERY 4 HOURS PRN
Status: DISCONTINUED | OUTPATIENT
Start: 2024-09-05 | End: 2024-09-05 | Stop reason: HOSPADM

## 2024-09-05 RX ORDER — CEFAZOLIN SODIUM 2 G/50ML
2000 SOLUTION INTRAVENOUS ONCE
Status: COMPLETED | OUTPATIENT
Start: 2024-09-05 | End: 2024-09-05

## 2024-09-05 RX ORDER — ASPIRIN 81 MG/1
81 TABLET ORAL 2 TIMES DAILY
Qty: 60 TABLET | Refills: 0 | Status: SHIPPED | OUTPATIENT
Start: 2024-09-05 | End: 2024-10-05

## 2024-09-05 RX ORDER — MAGNESIUM HYDROXIDE 1200 MG/15ML
LIQUID ORAL AS NEEDED
Status: DISCONTINUED | OUTPATIENT
Start: 2024-09-05 | End: 2024-09-05 | Stop reason: HOSPADM

## 2024-09-05 RX ORDER — SODIUM CHLORIDE, SODIUM LACTATE, POTASSIUM CHLORIDE, CALCIUM CHLORIDE 600; 310; 30; 20 MG/100ML; MG/100ML; MG/100ML; MG/100ML
75 INJECTION, SOLUTION INTRAVENOUS CONTINUOUS
Status: DISCONTINUED | OUTPATIENT
Start: 2024-09-05 | End: 2024-09-05 | Stop reason: HOSPADM

## 2024-09-05 RX ORDER — CHLORHEXIDINE GLUCONATE ORAL RINSE 1.2 MG/ML
15 SOLUTION DENTAL ONCE
Status: COMPLETED | OUTPATIENT
Start: 2024-09-05 | End: 2024-09-05

## 2024-09-05 RX ORDER — PANTOPRAZOLE SODIUM 40 MG/1
40 TABLET, DELAYED RELEASE ORAL DAILY
Status: DISCONTINUED | OUTPATIENT
Start: 2024-09-05 | End: 2024-09-05 | Stop reason: HOSPADM

## 2024-09-05 RX ORDER — CEPHALEXIN 500 MG/1
2000 CAPSULE ORAL EVERY 8 HOURS SCHEDULED
Qty: 8 CAPSULE | Refills: 0 | Status: SHIPPED | OUTPATIENT
Start: 2024-09-05 | End: 2024-09-06

## 2024-09-05 RX ORDER — SIMETHICONE 80 MG
80 TABLET,CHEWABLE ORAL 4 TIMES DAILY PRN
Status: DISCONTINUED | OUTPATIENT
Start: 2024-09-05 | End: 2024-09-05 | Stop reason: HOSPADM

## 2024-09-05 RX ORDER — ASCORBIC ACID 500 MG
500 TABLET ORAL 2 TIMES DAILY
Status: DISCONTINUED | OUTPATIENT
Start: 2024-09-05 | End: 2024-09-05 | Stop reason: HOSPADM

## 2024-09-05 RX ORDER — OXYCODONE HYDROCHLORIDE 5 MG/1
5 TABLET ORAL EVERY 4 HOURS PRN
Status: DISCONTINUED | OUTPATIENT
Start: 2024-09-05 | End: 2024-09-05 | Stop reason: HOSPADM

## 2024-09-05 RX ORDER — MIDAZOLAM HYDROCHLORIDE 2 MG/2ML
INJECTION, SOLUTION INTRAMUSCULAR; INTRAVENOUS AS NEEDED
Status: DISCONTINUED | OUTPATIENT
Start: 2024-09-05 | End: 2024-09-05

## 2024-09-05 RX ORDER — SENNOSIDES 8.6 MG
650 CAPSULE ORAL EVERY 6 HOURS PRN
Qty: 90 TABLET | Refills: 0 | Status: SHIPPED | OUTPATIENT
Start: 2024-09-05

## 2024-09-05 RX ORDER — FERROUS SULFATE 325(65) MG
325 TABLET ORAL 2 TIMES DAILY WITH MEALS
Status: DISCONTINUED | OUTPATIENT
Start: 2024-09-05 | End: 2024-09-05 | Stop reason: HOSPADM

## 2024-09-05 RX ORDER — PROPOFOL 10 MG/ML
INJECTION, EMULSION INTRAVENOUS CONTINUOUS PRN
Status: DISCONTINUED | OUTPATIENT
Start: 2024-09-05 | End: 2024-09-05

## 2024-09-05 RX ORDER — ONDANSETRON 4 MG/1
4 TABLET, ORALLY DISINTEGRATING ORAL EVERY 8 HOURS PRN
Qty: 15 TABLET | Refills: 0 | Status: SHIPPED | OUTPATIENT
Start: 2024-09-05

## 2024-09-05 RX ORDER — GABAPENTIN 300 MG/1
300 CAPSULE ORAL
Status: DISCONTINUED | OUTPATIENT
Start: 2024-09-05 | End: 2024-09-05 | Stop reason: HOSPADM

## 2024-09-05 RX ORDER — KETAMINE HYDROCHLORIDE 100 MG/ML
INJECTION, SOLUTION INTRAMUSCULAR; INTRAVENOUS AS NEEDED
Status: DISCONTINUED | OUTPATIENT
Start: 2024-09-05 | End: 2024-09-05

## 2024-09-05 RX ORDER — BUPIVACAINE HYDROCHLORIDE 7.5 MG/ML
INJECTION, SOLUTION INTRASPINAL AS NEEDED
Status: DISCONTINUED | OUTPATIENT
Start: 2024-09-05 | End: 2024-09-05

## 2024-09-05 RX ORDER — ONDANSETRON 2 MG/ML
INJECTION INTRAMUSCULAR; INTRAVENOUS AS NEEDED
Status: DISCONTINUED | OUTPATIENT
Start: 2024-09-05 | End: 2024-09-05

## 2024-09-05 RX ORDER — DEXAMETHASONE SODIUM PHOSPHATE 10 MG/ML
INJECTION, SOLUTION INTRAMUSCULAR; INTRAVENOUS AS NEEDED
Status: DISCONTINUED | OUTPATIENT
Start: 2024-09-05 | End: 2024-09-05

## 2024-09-05 RX ORDER — ONDANSETRON 2 MG/ML
4 INJECTION INTRAMUSCULAR; INTRAVENOUS ONCE AS NEEDED
Status: DISCONTINUED | OUTPATIENT
Start: 2024-09-05 | End: 2024-09-05 | Stop reason: HOSPADM

## 2024-09-05 RX ORDER — FENTANYL CITRATE/PF 50 MCG/ML
25 SYRINGE (ML) INJECTION
Status: DISCONTINUED | OUTPATIENT
Start: 2024-09-05 | End: 2024-09-05 | Stop reason: HOSPADM

## 2024-09-05 RX ORDER — OXYCODONE HYDROCHLORIDE 10 MG/1
10 TABLET ORAL EVERY 4 HOURS PRN
Status: DISCONTINUED | OUTPATIENT
Start: 2024-09-05 | End: 2024-09-05 | Stop reason: HOSPADM

## 2024-09-05 RX ADMIN — KETAMINE HYDROCHLORIDE 20 MG: 100 INJECTION, SOLUTION, CONCENTRATE INTRAMUSCULAR; INTRAVENOUS at 10:40

## 2024-09-05 RX ADMIN — BUPIVACAINE HYDROCHLORIDE IN DEXTROSE 1.6 ML: 7.5 INJECTION, SOLUTION SUBARACHNOID at 09:30

## 2024-09-05 RX ADMIN — KETAMINE HYDROCHLORIDE 10 MG: 100 INJECTION, SOLUTION, CONCENTRATE INTRAMUSCULAR; INTRAVENOUS at 10:21

## 2024-09-05 RX ADMIN — BUPIVACAINE HYDROCHLORIDE 20 ML: 2.5 INJECTION, SOLUTION EPIDURAL; INFILTRATION; INTRACAUDAL; PERINEURAL at 09:25

## 2024-09-05 RX ADMIN — TRANEXAMIC ACID 1000 MG: 10 INJECTION, SOLUTION INTRAVENOUS at 09:43

## 2024-09-05 RX ADMIN — LIDOCAINE HYDROCHLORIDE 5 ML: 10 INJECTION, SOLUTION EPIDURAL; INFILTRATION; INTRACAUDAL; PERINEURAL at 09:30

## 2024-09-05 RX ADMIN — BUPIVACAINE 10 ML: 13.3 INJECTION, SUSPENSION, LIPOSOMAL INFILTRATION at 09:15

## 2024-09-05 RX ADMIN — PHENYLEPHRINE HYDROCHLORIDE 30 MCG/MIN: 10 INJECTION INTRAVENOUS at 10:20

## 2024-09-05 RX ADMIN — FENTANYL CITRATE 100 MCG: 50 INJECTION INTRAMUSCULAR; INTRAVENOUS at 09:13

## 2024-09-05 RX ADMIN — CHLORHEXIDINE GLUCONATE 15 ML: 1.2 RINSE ORAL at 07:15

## 2024-09-05 RX ADMIN — SODIUM CHLORIDE: 0.9 INJECTION, SOLUTION INTRAVENOUS at 09:27

## 2024-09-05 RX ADMIN — MIDAZOLAM 2 MG: 1 INJECTION INTRAMUSCULAR; INTRAVENOUS at 09:13

## 2024-09-05 RX ADMIN — BUPIVACAINE HYDROCHLORIDE 10 ML: 5 INJECTION, SOLUTION EPIDURAL; INTRACAUDAL; PERINEURAL at 09:15

## 2024-09-05 RX ADMIN — MIDAZOLAM 2 MG: 1 INJECTION INTRAMUSCULAR; INTRAVENOUS at 09:30

## 2024-09-05 RX ADMIN — KETAMINE HYDROCHLORIDE 20 MG: 100 INJECTION, SOLUTION, CONCENTRATE INTRAMUSCULAR; INTRAVENOUS at 09:39

## 2024-09-05 RX ADMIN — ONDANSETRON 4 MG: 2 INJECTION INTRAMUSCULAR; INTRAVENOUS at 09:51

## 2024-09-05 RX ADMIN — SODIUM CHLORIDE 125 ML/HR: 0.9 INJECTION, SOLUTION INTRAVENOUS at 07:29

## 2024-09-05 RX ADMIN — PROPOFOL 50 MG: 10 INJECTION, EMULSION INTRAVENOUS at 11:00

## 2024-09-05 RX ADMIN — CEFAZOLIN SODIUM 2000 MG: 2 SOLUTION INTRAVENOUS at 09:40

## 2024-09-05 RX ADMIN — PROPOFOL 80 MCG/KG/MIN: 10 INJECTION, EMULSION INTRAVENOUS at 09:38

## 2024-09-05 RX ADMIN — DEXAMETHASONE SODIUM PHOSPHATE 10 MG: 10 INJECTION INTRAMUSCULAR; INTRAVENOUS at 09:51

## 2024-09-05 NOTE — PHYSICAL THERAPY NOTE
PT EVALUATION    Pt. Name: Gurdeep Martin  Pt. Age: 59 y.o.  MRN: [id number]  LENGTH OF STAY: 0      Admitting Diagnoses:   Primary osteoarthritis of left knee [M17.12]    Past Medical History:   Diagnosis Date    Arthritis     Gout     Hyperlipidemia     Hypertension        Past Surgical History:   Procedure Laterality Date    CARPAL TUNNEL RELEASE Bilateral     COLONOSCOPY      NEUROMA EXCISION Left     Foot    IN ARTHRP KNE CONDYLE&PLATU MEDIAL&LAT COMPARTMENTS Right 2/22/2024    Procedure: ARTHROPLASTY KNEE TOTAL, same day d/c;  Surgeon: Migue Martin MD;  Location: [facility];  Service: Orthopedics       Imaging Studies:  No orders to display         09/05/24 1454   PT Last Visit   PT Visit Date 09/05/24   Note Type   Note type Evaluation   Pain Assessment   Pain Assessment Tool 0-10   Pain Score 4  (0/10 at rest; 4/10 during amb)   Pain Location/Orientation Orientation: Left;Location: Knee   Hospital Pain Intervention(s) Repositioned;Ambulation/increased activity;Emotional support;Rest   Restrictions/Precautions   Weight Bearing Precautions Per Order Yes   LLE Weight Bearing Per Order WBAT   Other Precautions Fall Risk;Pain;Telemetry   Home Living   Type of Home House   Home Layout Two level;Performs ADLs on one level;Able to live on main level with bedroom/bathroom;Stairs to enter with rails;Other (Comment)  (5STE w/ B/L HR; pt has 1st floor bed/bath)   Bathroom Shower/Tub Walk-in shower   Bathroom Toilet Raised   Bathroom Equipment Grab bars in shower;Shower chair   Home Equipment Walker   Prior Function   Level of [place] Independent with functional mobility;Independent with ADLs;Independent with IADLS  (w/ RW)   Lives With Spouse   Receives Help From Family   Falls in the last 6 months 0   Vocational Full time employment   Comments (+)    General   Additional Pertinent History h/o R TKR 2/22/24   Family/Caregiver Present Yes  (wife)   Cognition   Overall Cognitive Status WFL    Arousal/Participation Alert   Orientation Level Oriented X4   Following Commands Follows all commands and directions without difficulty   Comments pleasant & cooperative   Subjective   Subjective Pt agreeable to PT/OT evals.   RUE Assessment   RUE Assessment   (refer to OT)   LUE Assessment   LUE Assessment   (refer to OT)   RLE Assessment   RLE Assessment WNL   LLE Assessment   LLE Assessment X  (3-/5 grossly except ankle 4/5)   Coordination   Movements are Fluid and Coordinated 1   Sensation X  (post-op numbness, buttocks)   Bed Mobility   Supine to Sit Unable to assess   Sit to Supine Unable to assess   Transfers   Sit to Stand 6  Modified independent   Additional items Armrests   Stand to Sit 6  Modified independent   Additional items Armrests   Ambulation/Elevation   Gait pattern Antalgic;Excessively slow   Gait Assistance 5  Supervision   Additional items Verbal cues   Assistive Device Rolling walker   Distance ~150'x1   Stair Management Assistance 5  Supervision   Additional items Verbal cues   Stair Management Technique Two rails;Step to pattern;Foreward;Nonreciprocal   Number of Stairs 5  (x2;  stairs)   Ambulation/Elevation Additional Comments no gross LOB noted   Balance   Static Sitting Good   Dynamic Sitting Fair +   Static Standing Fair  (w/ RW)   Dynamic Standing Fair -  (w/ RW)   Ambulatory Fair -  (w/ RW)   Endurance Deficit   Endurance Deficit No   Activity Tolerance   Activity Tolerance Patient tolerated treatment well   Medical Staff Made Aware OTR Liz   Nurse Made Aware yes   Assessment   Prognosis Good   Problem List Decreased strength;Decreased range of motion;Decreased endurance;Impaired balance;Decreased mobility;Impaired sensation;Pain   Assessment Pt. 59 y.o.male s/p L TKR 2* to  Primary osteoarthritis of left knee. Pt referred to PT for mobility assessment & D/C planning w/ orders of Up with assistance, & WBAT LLE . Please see above for information re: home set-up & PLOF as  "well as objective findings during PT assessment. PTA, pt reports being I w/o AD. On eval, pt functioning minimally below baseline hence will continue skilled PT to improve function & safety. Pt require modified I w/ transfers however require S for amb w/ RW & stair management + cues for techniques & safety. Gait deviations as above, slow & antalgic but no gross LOB noted.  The patient's AM-PAC Basic Mobility Inpatient Short Form Raw Score is 20. A Raw score of greater than 16 suggests the patient may benefit from discharge to home. Please also refer to the recommendation of the Physical Therapist for safe discharge planning. From PT standpoint, will recommend Level III (minimum resource intensity) rehab services and inc family support at D/C. Pt reports he has OPPT scheduled on Monday 9/9/24. No SOB & dizziness reported t/o session. Nsg staff most recent vital signs as follows: /83 Comment: Dangling at bedside. Without dizziness. VSS. Tolerated snack/lunch. Full senstion/able to bend ankles and knees. Transferred to chair. Pain 2/10.  Pulse 66   Temp (!) 96.8 °F (36 °C)   Resp (!) 28   Ht 5' 8\" (1.727 m)   Wt 108 kg (238 lb 12.1 oz)   SpO2 100%   BMI 36.30 kg/m² . At end of session, pt OOB in chair in stable condition, call bell & phone in reach. Fall precautions reinforced w/ good understanding. CM to follow. Nsg staff to continue to mobilized pt (OOB in chair for all meals & ambulate in room/unit) as tolerated to prevent further decline in function. Will also recommend Rest[facility]ative for daily amb &/or daily OOB in chair as appropriate. Nsg notified. Co-eval was necessary to complete this PT eval for the pt's best interest given pt's medical acuity & complexity.   Barriers to Discharge None   Goals   Patient Goals to go home today   STG Expiration Date 09/12/24   Short Term Goal #1 Goals to be met in 7 days; pt will be able to: 1) inc strength & balance by 1/2 grade to improve overall functional mobility " & dec fall risk; 2) inc bed mobility to modified I for pt to be able to get in/OOB safely w/ proper techniques 100% of the time, to dec caregiver burden & safely function at home; 3) inc transfers to I for pt to transition safely from one surface to another w/o % of the time, to dec caregiver burden & safely function at home; 4) inc amb w/ RW approx. >350' w/ modified I for pt to ambulate community distances w/o any % of the time, to dec caregiver burden & safely function at home; 5) negotiate stairs w/ modified I for inc safety during stair mgt inside/outside of home & dec caregiver burden; 6) pt/caregiver ed   PT Treatment Day 0   Plan   Treatment/Interventions Functional transfer training;LE strengthening/ROM;Elevations;Therapeutic exercise;Endurance training;Patient/family training;Bed mobility;Gait training;Spoke to nursing;OT;Family   PT Frequency 2-3x/wk   Discharge Recommendation   Rehab Resource Intensity Level, PT III (Minimum Resource Intensity)   Equipment Recommended Walker  (pt has)   Additional Comments restorative for daily amb   AM-PAC Basic Mobility Inpatient   Turning in Flat Bed Without Bedrails 4   Lying on Back to Sitting on Edge of Flat Bed Without Bedrails 3   Moving Bed to Chair 3   Standing Up From Chair Using Arms 4   Walk in Room 3   Climb 3-5 Stairs With Railing 3   Basic Mobility Inpatient Raw Score 20   Basic Mobility Standardized Score 43.99   [facility] Highest Level Of Mobility   -HLM Goal 6: Walk 10 steps or more   -HLM Achieved 7: Walk 25 feet or more   End of Consult   Patient Position at End of Consult Bedside chair;All needs within reach   End of Consult Comments Pt in stable condition. All needs in reach.   Hx/personal factors: co-morbidities, inaccessible home, use of AD, pain, fall risk, and assist w/ ADL's  Examination: dec mobility, dec balance, dec endurance, dec amb, risk for falls, pain  Clinical: unpredictable (ongoing medical status, risk for  falls, POD #0, and pain mgt)  Complexity: high    Pratik Stephenie

## 2024-09-05 NOTE — ANESTHESIA PROCEDURE NOTES
Spinal Block    Patient location during procedure: OR  Start time: 9/5/2024 9:30 AM  Reason for block: procedure for pain and at surgeon's request  Staffing  Performed by: Chris Jackson CRNA  Authorized by: Dorita Armenta DO    Preanesthetic Checklist  Completed: patient identified, IV checked, site marked, risks and benefits discussed, surgical consent, monitors and equipment checked, pre-op evaluation and timeout performed  Spinal Block  Patient position: sitting  Prep: ChloraPrep and site prepped and draped  Patient monitoring: frequent blood pressure checks, continuous pulse ox and heart rate  Approach: midline  Location: L3-4  Needle  Needle type: Pencan   Needle gauge: 24 G  Needle length: 4 in  Assessment  Sensory level: T4  Injection Assessment:  negative aspiration for heme, no paresthesia on injection and positive aspiration for clear CSF.  Post-procedure:  site cleaned

## 2024-09-05 NOTE — ANESTHESIA PROCEDURE NOTES
Peripheral Block    Patient location during procedure: holding area  Start time: 9/5/2024 9:12 AM  Reason for block: at surgeon's request and post-op pain management  Staffing  Performed by: Dorita Armenta DO  Authorized by: Dorita Armenta DO    Preanesthetic Checklist  Completed: patient identified, IV checked, site marked, risks and benefits discussed, surgical consent, monitors and equipment checked, pre-op evaluation and timeout performed  Peripheral Block  Patient position: supine  Prep: ChloraPrep  Patient monitoring: frequent blood pressure checks, continuous pulse oximetry and heart rate  Block type: Adductor Canal (superomedial and lateral, inferomedial geniculars and nerve to vastus medialis)  Laterality: left  Injection technique: single-shot  Procedures: ultrasound guided, Ultrasound guidance required for the procedure to increase accuracy and safety of medication placement and decrease risk of complications.  Ultrasound permanent image saved  bupivacaine liposomal (EXPAREL) 1.3 % injection 20 mL - Perineural   10 mL - 9/5/2024 9:15:00 AM  bupivacaine (PF) (MARCAINE) 0.5 % injection 20 mL - Perineural   10 mL - 9/5/2024 9:15:00 AM  bupivacaine (PF) (MARCAINE) 0.25 % injection 20 mL - Perineural   20 mL - 9/5/2024 9:25:00 AM  Needle  Needle type: Stimuplex   Needle gauge: 20 G  Needle length: 4 in  Needle localization: anatomical landmarks and ultrasound guidance  Assessment  Injection assessment: incremental injection, frequent aspiration, injected with ease, negative aspiration, negative for heart rate change, no paresthesia on injection, no symptoms of intraneural/intravenous injection and needle tip visualized at all times  Paresthesia pain: none  Post-procedure:  site cleaned  patient tolerated the procedure well with no immediate complications

## 2024-09-05 NOTE — DISCHARGE INSTR - AVS FIRST PAGE
POSTOPERATIVE INSTRUCTIONS following KNEE SURGERY    MEDICATIONS:  Resume all home medications unless otherwise instructed by your surgeon.  Antibiotic: Keflex 500 mg take 4 pills at 8 PM tonight and 4 pills at 8 AM tomorrow morning  Pain Medication:  Oxycodone 5 mg, 1-2 tablets every 4 hours as needed  If you were given a regional anesthetic (nerve block), please begin taking the pain medication as soon as you get home, even if you have minimal or no pain.  DO NOT WAIT FOR THE NERVE BLOCK TO WEAR OFF.  Possible side effects include nausea, constipation, and urinary retention.  If you experience these side effects, please call our office for assistance.  Pain med refills are authorized only during office hours (8am-4pm, Mon-Fri).  Anti-Inflammatory:  Tylenol 650 mg, 1 tablet every 6 hours and Naproxen 500 mg, 1 tablet every 12 hours  Take with food.  Stop if you experience nausea, reflux, or stomach pain.  Nausea Medication:  Zofran ODT 4 mg, 1 tablet every 6 hours as needed  Fill prescription ONLY if you expericnce severe nausea.  Blood Clot Prevention:  Aspirin 81 mg, 1 tablet twice daily for 30 days  Pump your foot up and down 20 times per hour while you are less mobile.    WOUND CARE:  Keep the dressing clean and dry.  Light drainage may occur the first 2 days postop.  Mepilex dressing for 7 days then dry dressing change only as needed.  Should continue wear RAÚL stockings until seen in the office.  Please call our office ([phone]) if you experience either of the following:  Sudden increase in swelling, redness, or warmth at the surgical site  Excessive incisional drainage that persists beyond the 3rd day after surgery  Oral temperature greater than 101 degrees, not relieved with Tylenol  Shortness of breath, chest pain, nausea, or any other concerning symptoms    SWELLING CONTROL:  Cold Therapy:  The cold therapy device may be used either continuously or only as needed, according to your preference.  Do not  "let the pad directly touch your skin.  Alternatively, apply ice (20 min on, 20 min off) as often as you feel is necessary.  Elevation:  Elevate the entire leg above heart level.  Place pillows under your ankle to keep your knee straight.  Compression:  Apply ACE wraps or a thigh-length compression stocking as needed.    RANGE OF MOTION:  You are allowed FULL RANGE OF MOTION as tolerated.    IMMOBILIZATION:  None.  You are allowed full range of motion as tolerated.    ACTIVITY:   BEAR FULL WEIGHT AS TOLERATED on the operative leg.  Use crutches to assist only as needed.  Using Crutches on Stairs:  Going up, lead with your \"good\" (nonoperative) leg.  Going down, lead with your \"bad\" (operative) leg.  Use a hand rail when available.  Knee Extension:  Place a rolled towel or pillow under your ankle for 20-30 minutes 3-5 times per day.  This will help to maintain full knee extension.  Quad Sets:  Sit or lie with your knee straight.  Tighten your quadriceps (front thigh) muscle.  Hold for 3 seconds, then relax.  Repeat 20 times per hour while awake.    PHYSICAL THERAPY:  Begin therapy 5 TO 7 DAYS AFTER SURGERY.  You were given a prescription for therapy at your preoperative office visit.  If you do not have physical therapy scheduled yet, please call our office for assistance.    FOLLOW-UP APPOINTMENT:  2 weeks after surgery with:    Dr. Migue Martin MD  [facility]  [address] [place] [address], [address], [place] [address]  [phone] ([place] Office)  [phone] (After-Hours)   "

## 2024-09-05 NOTE — OCCUPATIONAL THERAPY NOTE
Occupational Therapy Evaluation     Patient Name: Gurdeep Martin  Today's Date: 9/5/2024  Problem List  Principal Problem:    Primary osteoarthritis of left knee    Past Medical History  Past Medical History:   Diagnosis Date    Arthritis     Gout     Hyperlipidemia     Hypertension      Past Surgical History  Past Surgical History:   Procedure Laterality Date    CARPAL TUNNEL RELEASE Bilateral     COLONOSCOPY      NEUROMA EXCISION Left     Foot    [place] ARTHRP KNE CONDYLE&PLATU MEDIAL&LAT COMPARTMENTS Right 2/22/2024    Procedure: ARTHROPLASTY KNEE TOTAL, same day d/c;  Surgeon: Migue Martin MD;  Location: [facility];  Service: Orthopedics           09/05/24 1437   OT Last Visit   OT Visit Date 09/05/24   Note Type   Note type Evaluation   Additional Comments greeted in chair and wife at bedside.   Pain Assessment   Pain Assessment Tool 0-10   Pain Score 2   Restrictions/Precautions   Weight Bearing Precautions Per Order Yes   LLE Weight Bearing Per Order WBAT   Home Living   Type of Home House   Home Layout Multi-level;Stairs to enter with rails  (5STE)   Bathroom Shower/Tub Walk-in shower   Bathroom Toilet Raised   Bathroom Equipment Grab bars in shower;Shower chair   Home Equipment Walker   Prior Function   Level of [place] Independent with ADLs;Independent with functional mobility;Independent with IADLS   Lives With Spouse   Receives Help From Family   IADLs Independent with driving;Independent with meal prep;Independent with medication management   Falls in the last 6 months 0   Vocational Full time employment   ADL   Where Assessed Chair   Eating Assistance 7  Independent   Grooming Assistance 7  Independent   UB Bathing Assistance 6  Modified Independent   LB Bathing Assistance 5  Supervision/Setup   UB Dressing Assistance 6  Modified independent   LB Dressing Assistance 4  Minimal Assistance   Toileting Assistance  5  Supervision/Setup   Bed Mobility   Supine to Sit Unable to assess    Transfers   Sit to Stand 6  Modified independent   Stand to Sit 6  Modified independent   Functional Mobility   Functional Mobility 5  Supervision   Additional items Rolling walker   Balance   Static Sitting Normal   Dynamic Sitting Good   Static Standing Fair +   Dynamic Standing Fair   Ambulatory Fair   Activity Tolerance   Activity Tolerance Patient tolerated treatment well;Patient limited by pain   Medical Staff Made Aware PT Pratik   Nurse Made Aware RN   RUE Assessment   RUE Assessment WFL   LUE Assessment   LUE Assessment WFL   Hand Function   Gross Motor Coordination Functional   Fine Motor Coordination Functional   Psychosocial   Psychosocial (WDL) WDL   Cognition   Overall Cognitive Status WFL   Arousal/Participation Cooperative   Attention Within functional limits   Orientation Level Oriented X4   Memory Within functional limits   Following Commands Follows all commands and directions without difficulty   Assessment   Limitation Decreased ADL status;Decreased UE strength;Decreased Safe judgement during ADL;Decreased endurance;Decreased self-care trans;Decreased high-level ADLs   Prognosis Good   Assessment Gurdeep Martin is a 59 y.o. male seen for OT evaluation s/p admit to [facility] on 9/5/2024 w/ Primary osteoarthritis of left knee. S/p L knee replacement. Comorbidities affecting pt's functional performance at time of assessment include:  osteoarthritis, HTN, hyperlipidemia . Pt with active OT orders and activity orders for Up and OOB as tolerated. Personal factors affecting pt at time of IE include:difficulty performing ADLs, difficulty performing IADLs, and difficulty performing transfers/mobility. Prior to admission, Pt lives in a multilevel house with spouse and 5 TIM with bilateral handrails. Pt able to stay on main level with bedroom/bathroom. Pt has grab bars in walk-in shower and grab bars around raised toilet. (+) . At baseline, pt was independent with all ADLs/IADLs . Upon evaluation: Pt  currently requires Humberto for UB ADLs, Tiesha for LB ADLs, supervision for toileting, ALTON for bed mobility, supervision for functional transfers, and supervision RW mobility 2* the following deficits impacting occupational performance:weakness, decreased strength , decreased balance, and decreased activity tolerance.Pt to benefit from continued skilled OT tx while in the hospital to address deficits as defined above and maximize level of functional independence w ADL's and functional mobility. Occupational Performance areas to address include: bathing/shower, toilet hygiene, dressing, functional mobility, and clothing management. From OT standpoint, recommendation at time of d/c would be level 3 resources. OT to follow pt on caseload 2-3x/wk.   Goals   STG Time Frame 3-5   Short Term Goal #1 Pt will complete LB dressing/self care w/ mod I using adaptive device and DME as needed   Short Term Goal #2 Pt will improve functional mobility during ADL/IADL/leisure tasks to Mod I using DME as needed w/ G balance/safety   LTG Time Frame 10-14   Long Term Goal #1 Pt will complete bed mobility at a Mod I level w/ G balance/safety demonstrated to decrease caregiver assistance required   Long Term Goal #2 Pt will participate in simulated IADL management task to increase independence to Mod I w/ G safety and endurance   Plan   Treatment Interventions ADL retraining;Functional transfer training;UE strengthening/ROM;Endurance training;Cognitive reorientation;Patient/family training;Equipment evaluation/education;Fine motor coordination activities;Compensatory technique education;Activityengagement;Energy conservation   Goal Expiration Date 09/19/24   OT Treatment Day 0   OT Frequency 2-3x/wk   Discharge Recommendation   Rehab Resource Intensity Level, OT III (Minimum Resource Intensity)   AM-PAC Daily Activity Inpatient   Lower Body Dressing 3   Bathing 3   Toileting 3   Upper Body Dressing 4   Grooming 4   Eating 4   Daily Activity  Raw Score 21   Daily Activity Standardized Score (Calc for Raw Score >=11) 44.27   AM-PAC Applied Cognition Inpatient   Following a Speech/Presentation 4   Understanding Ordinary Conversation 4   Taking Medications 4   Remembering Where Things Are Placed or Put Away 4   Remembering List of 4-5 Errands 4   Taking Care of Complicated Tasks 4   Applied Cognition Raw Score 24   Applied Cognition Standardized Score 62.21   Faustina Sorenson, OT

## 2024-09-05 NOTE — INTERVAL H&P NOTE
H&P reviewed. After examining the patient I find no changes in the patients condition since the H&P had been written.    Vitals:    09/05/24 0659   BP: 138/72   Pulse: 67   Resp: 16   Temp: 97.8 °F (36.6 °C)   SpO2: 97%

## 2024-09-05 NOTE — ANESTHESIA POSTPROCEDURE EVALUATION
Post-Op Assessment Note    CV Status:  Stable    Pain management: adequate       Mental Status:  Alert and awake   Hydration Status:  Euvolemic   PONV Controlled:  Controlled   Airway Patency:  Patent  Two or more mitigation strategies used for obstructive sleep apnea   Post Op Vitals Reviewed: Yes    No anethesia notable event occurred.    Staff: Anesthesiologist, CRNA               /78 (09/05/24 1116)    Temp (!) 96.7 °F (35.9 °C) (09/05/24 1116)    Pulse 65 (09/05/24 1116)   Resp 18 (09/05/24 1116)    SpO2 96 % (09/05/24 1116)

## 2024-09-05 NOTE — OP NOTE
OPERATIVE REPORT    PATIENT NAME: Gurdeep Martin   :  1965  MRN: [id number]  Pt Location: [facility] OR ROOM 01    SURGERY DATE: 2024    SURGEON(S) and ROLE:  Primary: Migue Martin MD  Assisting: Tanvir Macedo MD; Yadira Orozco DPM    NOTE:  I was present for the entire procedure and performed all essential portions of the surgery.      PREOPERATIVE DIAGNOSES:  Left Knee Osteoarthritis    POSTOPERATIVE DIAGNOSES:  Same as Preoperative Diagnosis    PROCEDURES:  Left Total Knee Arthroplasty      ANESTHESIA TYPE:  Spinal    ANESTHESIA STAFF:   Anesthesiologist: Dorita Armenta DO  CRNA: Chris Jackson CRNA    ESTIMATED BLOOD LOSS:  150 mL    TOURNIQUET TIME:  not used    PERIOPERATIVE ANTIBIOTICS:  cefazolin, 2 grams    IMPLANTS: Depuy Attune    Femur:  Size 6 PS cementless    Tibia:  Size 6 FB Affixium cementless    Patella: 38 mm oval dome cemented    Poly : 5 mm PS      Implant Name Type Inv. Item Serial No.  Lot No. LRB No. Used Action   DEPUY BONE CEMENT CMW2 - [id number]  DEPUY BONE CEMENT CMW2  DEPUY [id number] Left 1 Implanted   COMPONENT FEM SZ 6 LT  CMNTLS POR PS ATTUNE - [id number]  COMPONENT FEM SZ 6 LT  CMNTLS POR PS ATTUNE  DEPUY [id number] Left 1 Implanted   INSERT TIBIAL 5MM SZ 6 FB PS ATTUNE - [id number]  INSERT TIBIAL 5MM SZ 6 FB PS ATTUNE  DEPUY [id number] Left 1 Implanted   BASEPLATE TIBIAL SZ 6 FX BRNG  ATTUNE - [id number]  BASEPLATE TIBIAL SZ 6 FX BRNG  ATTUNE  DEPUY [id number] Left 1 Implanted   COMPONENT PATELLA 38MM MEDIAL DOME ATTUNE - [id number]  COMPONENT PATELLA 38MM MEDIAL DOME ATTUNE  DEPUY [id number] Left 1 Implanted       SPECIMENS:  * No specimens in log *    DRAINS:  None      OPERATIVE INDICATIONS:  The patient is a 59 y.o. male with left knee pain and severe osteoarthritis.  Surgical treatment was indicated due to persistent symptoms despite non-surgical treatment.  After a thorough discussion of the potential risks, benefits, and alternative  treatments, the patient agreed to proceed with surgery.  The patient understands that the risks of surgery include, but are not limited to: infection, neurovascular injury, wound healing complications, venous thromboembolism, persistent pain, stiffness, instability, recurrence of symptoms, potential need for additional surgeries, and loss of limb or life.  Oral and written consent for surgery was obtained from the patient preoperatively.    PROCEDURE AND TECHNIQUE:  On the day of surgery, the patient was identified in the preoperative holding area.  The operative site was marked by the surgeon.  The patient was taken into the operating room.  A time-out was conducted to confirm the patient's identity, the operative site, and the proposed procedure.  The patient was anesthetized, and perioperative antibiotics were administered.  The patient was positioned supine on the OR table.  All bony prominences were padded.  A tourniquet was not used.  The operative site was prepped and draped using standard sterile technique.      An anterior midline incision was carried sharply to the extensor mechanism.  Hemostasis was obtained with electrocautery.  A medial parapatellar arthrotomy was made, and the patella was subluxated laterally.  Severe tri-compartmental degenerative changes were noted.  The infrapatellar fat pad, anterior horns of the menisci, cruciate ligaments, and synovium over the anterior femur were excised.  The lateral patellofemoral ligament was divided.  A lateral retinacular release was not performed.  The medial soft tissue sleeve was elevated from the tibia to the posteromedial corner.    Medial and lateral Z-retractors were placed on the tibia.  A Javed retractor placed in the intercondylar notch was used to subluxate the tibia anteriorly.  The extramedullary tibial guide was used to position the tibial cutting block perpendicular to the tibial mechanical axis.  The tibial cut was made with a 3 degree  posterior slope, removing 2 mm from the medial plateau and 7 mm from the lateral plateau.    The femoral canal was accessed with a reamer, and the intramedullary alignment vipul was placed.  The distal femoral cut was made in 6 degrees of valgus, removing 10 mm of bone.  A size 6 femoral cutting guide was placed.  Rotational alignment was referenced using the transepicondylar axis and Beltrami's line.  The anterior, posterior, and chamfer cuts were made.  The extension and flexion gaps were balanced to accept a 5 mm insert.  The box cuts were made using the appropriate sized cutting guide.  A posterior capsular release was performed with a Jain elevator.  Meniscus remnant and posterior osteophytes were removed from the posterior recess.  Hemostasis was obtained with electrocautery.    Trial components were placed.  The trial liner was adjusted as necessary to provide appropriate soft tissue tension and knee range of motion.  The knee demonstrated excellent range of motion from full extension to 120 degrees of flexion and appropriate varus / valgus stability in full extension and mid-flexion.  The patella was cut freehand, sized, and prepared to accept the patellar component.  A patellar trial was placed.  Patellar tracking was stable using the no-thumbs method.  Tibial component rotation was marked with electocautery.    The trial components were removed.  The tibia was exposed, sized, and prepared with the reamer and keel-punch.  Cement was mixed on the back table while the knee was irrigated with sterile saline, and the femoral canal was plugged with autograft bone.  The bone cuts were dried, and the tibial, femoral and patellar components were placed.  Compression was applied to the components while the cement cured.  Excess cement was removed.  The polyethylene liner was placed.  Hemostasis was obtained with electrocautery.  The knee was again irrigated with sterile saline.  A Hemovac drain was not placed.  The  arthrotomy and the deep fasica were closed with #1 Vicryl and #1 Stratafix sutures.  The skin was closed with 0 vicryl, 2-0 Stratafix and 3-0 Stratafix.  A sterile dressing was applied, and the drapes were removed.  The patient was awakened from anesthesia and taken to the PACU in stable condition.      COMPLICATIONS:  None    PATIENT DISPOSITION:  PACU       SIGNATURE:  Migue Martin MD  DATE:  September 5, 2024  TIME:  4:39 PM

## 2024-09-05 NOTE — ANESTHESIA PREPROCEDURE EVALUATION
Procedure:  ARTHROPLASTY KNEE TOTAL, same day dc (Left: Knee)    Relevant Problems   CARDIO   (+) Benign essential hypertension   (+) Mixed hyperlipidemia      MUSCULOSKELETAL   (+) Primary osteoarthritis of both knees   (+) Primary osteoarthritis of left knee   (+) Primary osteoarthritis of one hip, left   (+) Primary osteoarthritis of right knee        Physical Exam    Airway    Mallampati score: II  TM Distance: >3 FB  Neck ROM: full     Dental   No notable dental hx     Cardiovascular  Rhythm: regular, Rate: normal, Cardiovascular exam normal    Pulmonary  Pulmonary exam normal Breath sounds clear to auscultation    Other Findings        Anesthesia Plan  ASA Score- 2     Anesthesia Type- spinal with ASA Monitors.         Additional Monitors:     Airway Plan:     Comment: Adductor block.       Plan Factors-Exercise tolerance (METS): >4 METS.    Chart reviewed.   Existing labs reviewed. Patient summary reviewed.          Obstructive sleep apnea risk education given perioperatively.        Induction-     Postoperative Plan- Plan for postoperative opioid use.         Informed Consent- Anesthetic plan and risks discussed with patient.

## 2024-09-05 NOTE — PLAN OF CARE
Problem: PHYSICAL THERAPY ADULT  Goal: Performs mobility at highest level of function for planned discharge setting.  See evaluation for individualized goals.  Description: Treatment/Interventions: Functional transfer training, LE strengthening/ROM, Elevations, Therapeutic exercise, Endurance training, Patient/family training, Bed mobility, Gait training, Spoke to nursing, OT, Family  Equipment Recommended: Walker (pt has)       See flowsheet documentation for full assessment, interventions and recommendations.  Outcome: Adequate for Discharge  Note: Prognosis: Good  Problem List: Decreased strength, Decreased range of motion, Decreased endurance, Impaired balance, Decreased mobility, Impaired sensation, Pain  Assessment: Pt. 59 y.o.male s/p L TKR 2* to  Primary osteoarthritis of left knee. Pt referred to PT for mobility assessment & D/C planning w/ orders of Up with assistance, & WBAT LLE . Please see above for information re: home set-up & PLOF as well as objective findings during PT assessment. PTA, pt reports being I w/o AD. On eval, pt functioning minimally below baseline hence will continue skilled PT to improve function & safety. Pt require modified I w/ transfers however require S for amb w/ RW & stair management + cues for techniques & safety. Gait deviations as above, slow & antalgic but no gross LOB noted.  The patient's AM-PAC Basic Mobility Inpatient Short Form Raw Score is 20. A Raw score of greater than 16 suggests the patient may benefit from discharge to home. Please also refer to the recommendation of the Physical Therapist for safe discharge planning. From PT standpoint, will recommend Level III (minimum resource intensity) rehab services and inc family support at D/C. Pt reports he has OPPT scheduled on Monday 9/9/24. No SOB & dizziness reported t/o session. Ns staff most recent vital signs as follows: /83 Comment: Dangling at bedside. Without dizziness. VSS. Tolerated snack/lunch. Full  "senstion/able to bend ankles and knees. Transferred to chair. Pain 2/10.  Pulse 66   Temp (!) 96.8 °F (36 °C)   Resp (!) 28   Ht 5' 8\" (1.727 m)   Wt 108 kg (238 lb 12.1 oz)   SpO2 100%   BMI 36.30 kg/m² . At end of session, pt OOB in chair in stable condition, call bell & phone in reach. Fall precautions reinforced w/ good understanding. CM to follow. Nsg staff to continue to mobilized pt (OOB in chair for all meals & ambulate in room/unit) as tolerated to prevent further decline in function. Will also recommend [facility] for daily amb &/or daily OOB in chair as appropriate. Nsg notified. Co-eval was necessary to complete this PT eval for the pt's best interest given pt's medical acuity & complexity.    Barriers to Discharge: None     Rehab Resource Intensity Level, PT: III (Minimum Resource Intensity)    See flowsheet documentation for full assessment.        "

## 2024-09-06 ENCOUNTER — TELEPHONE (OUTPATIENT)
Dept: OBGYN CLINIC | Facility: HOSPITAL | Age: 59
End: 2024-09-06

## 2024-09-06 NOTE — TELEPHONE ENCOUNTER
"Patient contacted for a postoperative follow up assessment. Patient states current pain level of a  \"minimal\"  when sitting and 4/10 when walking with RW.  Patient denies increase in swelling and dressing is Dressing C/D/I Patient is icing the site regularly. NN educated patient on post-op swelling, icing, and constipation.    Confirmed upcoming appointments w/ patient:   PT: 9/9  Postop: 9/20     We reviewed patients AVS medication list. Patient is taking Oxycodone 5mg every 4 hours, ASA 81mg BID, and Tylenol 650mg every 6 hours PRN, Keflex 2000mg every 8 hours x2 doses, Naproxen 500mg BID, Zofran 4mg PRN,..Patient has had a BM but is not taking a stool softener. Educated on postop constipation, pt confirms he has Miralax at home and will begin daily. Educated on nerve block, taking Tylenol 650mg every 6 hours as instructed to transition. Pt verbalizes understanding.      Patient denies nausea, vomiting, abdominal pain, chest pain, shortness of breath, fever, dizziness, and calf pain Patient does not have any other questions or concerns at this time. Pt was encouraged to call with any questions, concerns or issues.  .    "

## 2024-09-09 ENCOUNTER — EVALUATION (OUTPATIENT)
Dept: PHYSICAL THERAPY | Facility: CLINIC | Age: 59
End: 2024-09-09
Payer: COMMERCIAL

## 2024-09-09 DIAGNOSIS — Z96.652 H/O TOTAL KNEE REPLACEMENT, LEFT: ICD-10-CM

## 2024-09-09 DIAGNOSIS — M17.12 PRIMARY OSTEOARTHRITIS OF LEFT KNEE: Primary | ICD-10-CM

## 2024-09-09 PROCEDURE — 97110 THERAPEUTIC EXERCISES: CPT | Performed by: PHYSICAL MEDICINE & REHABILITATION

## 2024-09-09 PROCEDURE — 97161 PT EVAL LOW COMPLEX 20 MIN: CPT | Performed by: PHYSICAL MEDICINE & REHABILITATION

## 2024-09-09 NOTE — PROGRESS NOTES
PT Evaluation     Today's date: 2024  Patient name: Gurdeep Martin  : 1965  MRN: [id number]  Referring provider: Migue Martin, *  Dx:   Encounter Diagnosis     ICD-10-CM    1. Primary osteoarthritis of left knee  M17.12 Ambulatory referral to Physical Therapy      2. H/O total knee replacement, left  Z96.652                      Assessment  Impairments: abnormal gait, abnormal muscle firing, abnormal muscle tone, abnormal or restricted ROM, abnormal movement, activity intolerance, impaired balance, impaired physical strength, lacks appropriate home exercise program, pain with function, unable to perform ADL, participation limitations, activity limitations and endurance  Symptom irritability: moderate    Assessment details: Pt is a 58 yo M presenting to outpatient physical therapy with noted impairments including L knee swelling and pain, impaired soft tissue mobility/flexibility, reduced joint range of motion, reduced strength, reduced joint positional awareness, altered gait, and reduced activity tolerance. Signs and symptoms at present are consistent with referring diagnosis of L knee pain 2* arthritis. He is s/p L TKA 24 and presents to PT today for first OPPT evaluation/tx session. He has been doing exercises at home as well. Overall he is doing well thus far. Due to noted impairments, the patient's present functional limitations include difficulty with ADLs with increased need for assistance, reliance on medication and/or modalities for pain relief, reduced tolerance for functional mobility and activity, and difficulty completing home,work, and self care responsibilities. Increased time was spent initiating and reviewing post op HEP/exercise program. Also spent increased time educating pt on post operative expectations, post op pain control with CP and ROM/exercise, importance of elevation and controlling swelling, and importance of mobility and ROM of L knee post op to tolerance.  "Pt's wife is a PTA and has been helping him with all these things as well. All pt questions/concerns were answered to his satisfaction. Patient to benefit from skilled outpatient physical therapy 1-2x/week for 10-12 weeks in order to reduce pain, maximize pain free range of motion, increase strength and stability, and improve functional mobility/functional activity in order to maximize return to prior level of function with reduced limitations. Home exercise program was provided and all questions answered to patient's level of satisfaction. Thank you for your referral.         Goals  STGs to be achieved in 4 weeks:  1. Pt to demonstrate reduced subjective pain rating \"at worst\" by at least 2-3 points from Initial Eval in order to allow for reduced pain with ADLs and improved functional activity tolerance.   2. Pt to demonstrate increased AROM of L knee flexion to at least 110 degrees and knee extension to no > -5*  in order to allow for greater ease and independence with ADLs and functional mobility.   3. Pt to demonstrate increased MMT of L knee grossly by at least 1/2-1 grade in order to improve safety and stability with ADLs and functional mobility.   4.Pt will demonstrate ability to safely ambulate at least 200ft without AD without pain/sx and with minimized gait deviations, independently, and without evidence of instability.        LTGs to be achieved in 10-12 weeks/By DC:  1. Pt will be I with HEP in order to continue to improve quality of life and independence and reduce risk for re-injury.   2. Pt to demonstrate return to PLOF and work without limitations or restrictions.   3. Pt to demonstrate improved function as noted by achieving or exceeding predicted score on FOTO outcomes assessment tool.          Plan  Patient would benefit from: skilled physical therapy  Referral necessary: No    Planned therapy interventions: balance, manual therapy, motor coordination training, neuromuscular re-education, " "patient/caregiver education, self care, strengthening, stretching, therapeutic activities, therapeutic exercise, flexibility, functional ROM exercises, gait training and home exercise program    Frequency: 1-2x/week.  Therapy duration (weeks): 10.  Plan of Care beginning date: 2024  Plan of Care expiration date: 10/21/2024  Treatment plan discussed with: patient, family and PTA        Subjective Evaluation    History of Present Illness  Mechanism of injury: Pt  is s/p L TKA 24; d/c home that day. WBAT with RW.   Pt notes at this time pain is well controlled with medications.   Some soreness after exercises at times at home; no sharp pains per pt.   Notes no instability or buckling L knee per pt.   Notes L knee/leg sill has some swelling; wearing compression sleeve.   Bruising L hS and leg /knee post op; still there. Soreness back of L thigh/HS area.   No n/t LLE.   LBP at present 2* \"sitting so much\" per pt.   Post op dressings intact for 1st week post op (can remove thurs per pt); denies any present signs/sx of infection at present.   Notes he has been doing supine SLR flex, seated LAQ, standing leg raises at home; bending and straitening the knee as well sitting/laying in bed.   RTD 24.     Quality of life: good    Patient Goals  Patient goals for therapy: decreased pain, decreased edema, increased strength, return to sport/leisure activities, return to work, improved balance, increased motion and independence with ADLs/IADLs    Pain  Current pain ratin  At best pain ratin  At worst pain ratin  Location: L knee ; back of L thigh  Quality: dull ache and discomfort (stiff)  Relieving factors: ice, rest, change in position and medications  Exacerbated by: exercises , too long/too much standing/walking.  Progression: improved    Social Support  Stairs in house: yes   Lives in: multiple-level home  Lives with: spouse    Employment status: working (presently [employer])  Hand dominance: " right    Treatments  Previous treatment: medication  Current treatment: medication and physical therapy        Objective     Observations   Left Knee   Positive for edema and incision.     Additional Observation Details  Post op dressing in place  Pt wearing full length compression sleeve thigh to ankle on L   Mild-moderate swelling L knee/thigh/leg  Bruising L thigh and leg and knee   No present signs/sx of infection or drainage at present  Decreased mm tone L thigh       Gait-  WBAT LLE with RW   Decreased L stance time- mild  Lacks TKE with L stance  Decreased L knee flex with swing       Neurological Testing     Sensation     Knee   Left Knee   Intact: Light touch    Right Knee   Intact: light touch     Active Range of Motion   Left Knee   Flexion: 105 degrees   Extension: -13 degrees     Right Knee   Normal active range of motion    Additional Active Range of Motion Details  Stiffness at end ranges L knee flex/ext; pt denies pain     L hip AROM WFL    L ankle- mild-mod L calf stiffness; no pain; - Homans L ; AROM WFL       Mobility   Patellar Mobility:   Left Knee   WFL: medial and lateral.   Hypomobile: left superior and left inferior    Strength/Myotome Testing     Left Knee   Flexion: 3  Extension: 3-  Quadriceps contraction: fair    Right Knee   Flexion: WFL  Extension: WFL  Quadriceps contraction: good    Additional Strength Details  L hip flex - 4-4+/5 seated  L hip ABD 4--4/5 seated     L ankle 4-4+/5 seated     Can perform SLR flex supine L with 0# without pain/sx; lacks TKE L knee however              Precautions: HTN, L knee pain , s/p R TKA 2/22      Re-eval Date: 10/21    Date 9/9/2024        Visit Count 1       FOTO 9/9/2024       Pain In 2       Pain Out 2           Manuals 9/9/2024        L knee pROM with gentle end range stretch as tolerated, L patellar mobs        L HS, Calf, Hip flexor/quad stretch                        Neuro Re-Ed        Romberg      Semi tandem      Tandem        "  Perturbation training                                                 Ther Ex        SRC vs Nustep AAROM L Knee       L heel slides   L3 8'           10x/5-10\"        L knee AAROM bottom step      Static L knee extension         Supine roll under ankle with QS   10x10\"        L QS      Calf stretch      HS stretch  Reviewed HEP     Supine 3x30\"       Supine 3x30\"        L hip flexor/quad stretch    HRs EOT 3x30\"         Reviewed HEP        SLRs standing-->mat table      HS curls      LAQ Standing 3 way L- reviewed HEP      Reviewed HEP       Mini squats      Step ups                         Ther Activity pt education regarding pathophysiology/pathoanatomy of present pain/sx and condition, role of PT in improving pain/sx and function, pt education regarding activity modification to avoid exacerbation of sx and delayed recovery;                       Gait Training Upcoming with RW--> no AD as able                        Modalities CP with elevation and knee ext after sessions - deferred to home today                             9/9/2024  - HEP was reviewed this date for above noted exercises. Pt demonstrated understanding without incident and without questions/concerns. Will continue to update upcoming.          "

## 2024-09-18 ENCOUNTER — OFFICE VISIT (OUTPATIENT)
Dept: PHYSICAL THERAPY | Facility: CLINIC | Age: 59
End: 2024-09-18
Payer: COMMERCIAL

## 2024-09-18 DIAGNOSIS — Z96.652 H/O TOTAL KNEE REPLACEMENT, LEFT: ICD-10-CM

## 2024-09-18 DIAGNOSIS — M17.12 PRIMARY OSTEOARTHRITIS OF LEFT KNEE: Primary | ICD-10-CM

## 2024-09-18 PROCEDURE — 97110 THERAPEUTIC EXERCISES: CPT | Performed by: PHYSICAL MEDICINE & REHABILITATION

## 2024-09-18 NOTE — PROGRESS NOTES
"Daily Note     Today's date: 2024  Patient name: Gurdeep Martin  : 1965  MRN: [id number]  Referring provider: Migue Martin, *  Dx:   Encounter Diagnosis     ICD-10-CM    1. Primary osteoarthritis of left knee  M17.12       2. H/O total knee replacement, left  Z96.652                      Subjective: Pt notes no new sx/complaints. Has been doing well. No present pain. Mild stiffness both knees. Walking without AD. 1 instance he felt his knee might buckle when walking down a slight decline; no falls; no issues since. Complaint with HEP. Removed post op bandage; denies any present signs/sx of infection. Compliant with HEP; did it this morning already.       Objective: See treatment diary below      Assessment: Tolerated treatment well. Pt without pain/sx during or after tx L knee. Pt ambulated into/out of clinic without AD; no imbalance or instability; mild gait deviations with reduced L stance, increased lateral flex with L stance, and decreased L knee flex with swing; cont to lack TKE on L with stance as well. Cont with quad mm atrophy and weakness on L. ROM L knee improving; AAROM L knee flex 122*; L knee ext AROM -8-9*; stiffness only at end ranges. Mild swelling local to L knee only. No present signs/sx of infection. Reviewed importance of activity pacing and not doing \"too much too soon\"; also cautioned on quad weakness and chance for knee buckling and to be cautious with mobility and stairs. No complaints after session. HEP reviewed. Patient demonstrated fatigue post treatment and would benefit from continued PT      Plan: Continue per plan of care.  Progress treatment as tolerated.       Precautions: HTN, L knee pain , s/p R TKA       Re-eval Date: 10/21    Date 2024      Visit Count 1 2      FOTO 2024       Pain In 2 0      Pain Out 2 0          Manuals 2024      L knee pROM with gentle end range stretch as tolerated, L patellar mobs  Gentle end range stretch " "into TKE with roll under L ankle, L patellar mobility as carolyn 5'       L HS, Calf, Hip flexor/quad stretch                        Neuro Re-Ed        Romberg      Semi tandem      Tandem         Perturbation training                                                 Ther Ex        SRC vs Nustep AAROM L Knee       L heel slides   L3 8'           10x/5-10\"    L4 10'           2x10/10\"       L knee AAROM bottom step      Static L knee extension         Supine roll under ankle with QS   10x10\"  2nd step 10x10\" as carolyn      Knee ext stretch/HS stretch 2nd step  10x10-15\"       L QS      Calf stretch      HS stretch  Reviewed HEP     Supine 3x30\"       Supine 3x30\"  QS roll under ankle for TKE  10x10\"               L hip flexor/quad stretch    HRs EOT 3x30\"         Reviewed HEP  EOT 4x30\"         Pt performed in waiting room       SLRs standing-->mat table      HS curls      LAQ Standing 3 way L- reviewed HEP      Reviewed HEP           Standing 0#   3x10     0# 3x10 /5\" with QS       Mini squats      Step ups           Calf stretch standing wedge  4x30\"               Ther Activity pt education regarding pathophysiology/pathoanatomy of present pain/sx and condition, role of PT in improving pain/sx and function, pt education regarding activity modification to avoid exacerbation of sx and delayed recovery;                       Gait Training Upcoming with RW--> no AD as able                        Modalities CP with elevation and knee ext after sessions - deferred to home today Pt deferred to home                             9/9/2024  - HEP was reviewed this date for above noted exercises. Pt demonstrated understanding without incident and without questions/concerns. Will continue to update upcoming.          "

## 2024-09-20 ENCOUNTER — OFFICE VISIT (OUTPATIENT)
Dept: OBGYN CLINIC | Facility: MEDICAL CENTER | Age: 59
End: 2024-09-20

## 2024-09-20 ENCOUNTER — APPOINTMENT (OUTPATIENT)
Dept: RADIOLOGY | Facility: MEDICAL CENTER | Age: 59
End: 2024-09-20
Payer: COMMERCIAL

## 2024-09-20 VITALS
WEIGHT: 245 LBS | SYSTOLIC BLOOD PRESSURE: 137 MMHG | DIASTOLIC BLOOD PRESSURE: 73 MMHG | HEIGHT: 68 IN | BODY MASS INDEX: 37.13 KG/M2 | HEART RATE: 76 BPM

## 2024-09-20 DIAGNOSIS — Z96.652 STATUS POST TOTAL KNEE REPLACEMENT, LEFT: Primary | ICD-10-CM

## 2024-09-20 DIAGNOSIS — Z96.652 STATUS POST TOTAL KNEE REPLACEMENT, LEFT: ICD-10-CM

## 2024-09-20 PROCEDURE — 99024 POSTOP FOLLOW-UP VISIT: CPT | Performed by: PHYSICIAN ASSISTANT

## 2024-09-20 PROCEDURE — 73562 X-RAY EXAM OF KNEE 3: CPT

## 2024-09-20 NOTE — PROGRESS NOTES
Orthopaedic Surgery - Office Note  Gurdeep Martin (59 y.o. male)   : 1965   MRN: [id number]  Encounter Date: 2024    Assessment / Plan    Status post left total knee replacement on 2024  Status post right total knee replacement on 2024  Continue with PT as ordered.  Weightbearing as tolerated progress activity as tolerated.  We did remind patient about dental antibiotic prophylaxis going forward at this time.  We did get updated x-rays of the left knee which showed prosthesis in good positioning with no signs of loosening, fracture or dislocation.  Aspirin 81 mg twice daily for DVT prophylaxis for 30 days total.  Continue with ice and analgesics/anti-inflammatories as needed.  Follow-up:  Return in about 6 weeks (around 2024) for follow up with Dr. Martin.      Chief Complaint / Date of Onset  Bilateral knee pain secondary to osteoarthritis chronic  Injury Mechanism / Date  None  Surgery / Date  Status post left total knee replacement on 2024  Status post right total knee replacement on 2024    History of Present Illness   Gurdeep Martin is a 59 y.o. male who presents for follow-up status post left total knee replacement on 2024.  Overall patient is doing very well.  He feels that he is progressing much quicker than when he had his right knee replaced.  His right knee overall is doing well also status post knee replacement on 2024 but he does have some feeling of tightness and his distal quad especially after standing for long periods of time but denies any other dysfunction from the right knee.  His left knee is painful in the area of the knee only but seems to be managed very well with pain.  He has been icing frequently and using the RAÚL stockings as instructed.  He denies any issues with the incision.  Denies any distal numbness or tingling at this time.  He did start outpatient physical therapy and feels he is progressing again well.    Treatment  "Summary  Medications / Modalities  None  Bracing / Immobilization  None  Physical Therapy  Completed PO PT  Injections  10/18/24  7/17/23  9/23/23  10/15/19  Prior Surgeries  Right TKA  Other Treatments  None     Employment / Current Status  [employer]     Sport / Organization / Current Status  N/A      Review of Systems  Pertinent items are noted in HPI.  All other systems were reviewed and are negative.      Physical Exam  /73   Pulse 76   Ht 5' 8\" (1.727 m)   Wt 111 kg (245 lb)   BMI 37.25 kg/m²   Cons: Appears well.  No apparent distress.  Psych: Alert. Oriented x3.  Mood and affect normal.  Eyes: PERRLA, EOMI  Resp: Normal effort.  No audible wheezing or stridor.  CV: Palpable pulse.  No discernable arrhythmia.  No LE edema.  Lymph:  No palpable cervical, axillary, or inguinal lymphadenopathy.  Skin: Warm.  No palpable masses.  No visible lesions.  Neuro: Normal muscle tone.  Normal and symmetric DTR's.     Right Knee Exam  Alignment:  Normal knee alignment.  Inspection:   Incision healing well at this time without erythema or active drainage.  Palpation:   Mild tenderness at kade-incisional areas.  ROM:  Knee Extension 2 degrees. Knee Flexion 110 easily patient states up to 120 in physical therapy.  Strength:  Able to actively extend knee against gravity.  Stability:  Not tested.  Tests:  No pertinent positive or negative tests.  Patella:  Not tested.  Neurovascular:  Sensation intact in DP/SP/Wong/Sa/T nerve distributions. Sensation intact in all digital nerve distributions.  Toes warm and perfused.  Gait:  Normal.       Studies Reviewed  I have personally reviewed pertinent films in PACS.  XR of right knee - from 9/20/2024 shows prosthesis in good position with no signs of loosening.  No fracture or dislocation.      Procedures  No procedures today.    Medical, Surgical, Family, and Social History  The patient's medical history, family history, and social history, were reviewed and updated as " appropriate.    Past Medical History:   Diagnosis Date    Arthritis     Gout     Hyperlipidemia     Hypertension        Past Surgical History:   Procedure Laterality Date    CARPAL TUNNEL RELEASE Bilateral     COLONOSCOPY      NEUROMA EXCISION Left     Foot    ME ARTHRP KNE CONDYLE&PLATU MEDIAL&LAT COMPARTMENTS Right 2/22/2024    Procedure: ARTHROPLASTY KNEE TOTAL, same day d/c;  Surgeon: Migue Martin MD;  Location: [facility];  Service: Orthopedics    ME ARTHRP KNE CONDYLE&PLATU MEDIAL&LAT COMPARTMENTS Left 9/5/2024    Procedure: ARTHROPLASTY KNEE TOTAL, same day dc;  Surgeon: Migue Martin MD;  Location: [facility];  Service: Orthopedics       History reviewed. No pertinent family history.    Social History     Occupational History    Not on file   Tobacco Use    Smoking status: Never    Smokeless tobacco: Never   Vaping Use    Vaping status: Never Used   Substance and Sexual Activity    Alcohol use: Yes     Alcohol/week: 3.0 standard drinks of alcohol     Types: 3 Standard drinks or equivalent per week     Comment: drinks liqor on weekends, last drank 9/1    Drug use: Never    Sexual activity: Yes       No Known Allergies      Current Outpatient Medications:     acetaminophen (TYLENOL) 650 mg CR tablet, Take 1 tablet (650 mg total) by mouth every 6 (six) hours as needed for mild pain, Disp: 90 tablet, Rfl: 0    aspirin (ECOTRIN LOW STRENGTH) 81 mg EC tablet, Take 1 tablet (81 mg total) by mouth 2 (two) times a day, Disp: 60 tablet, Rfl: 0    atorvastatin (LIPITOR) 20 mg tablet, Take 20 mg by mouth daily, Disp: , Rfl:     lisinopril-hydrochlorothiazide (PRINZIDE,ZESTORETIC) 20-12.5 MG per tablet, Take 1 tablet by mouth daily, Disp: , Rfl:     metoprolol succinate (TOPROL-XL) 50 mg 24 hr tablet, Take 50 mg by mouth daily, Disp: , Rfl:     Multiple Vitamin (multivitamin) tablet, Take 1 tablet by mouth daily, Disp: 30 tablet, Rfl: 1    naproxen (NAPROSYN) 500 mg tablet, Take 1 tablet (500 mg total)  by mouth 2 (two) times a day with meals, Disp: 60 tablet, Rfl: 0    ascorbic Acid (VITAMIN C) 500 MG CPCR, Take 1 capsule (500 mg total) by mouth daily (Patient not taking: Reported on 9/20/2024), Disp: 30 capsule, Rfl: 0    Cholecalciferol 25 MCG (1000 UT) tablet, Take 1,000 Units by mouth daily (Patient not taking: Reported on 9/20/2024), Disp: , Rfl:     ferrous sulfate 324 (65 Fe) mg, Take 1 tablet (324 mg total) by mouth 2 (two) times a day before meals (Patient not taking: Reported on 9/20/2024), Disp: 60 tablet, Rfl: 0    folic acid (FOLVITE) 1 mg tablet, Take 1 tablet (1 mg total) by mouth daily (Patient not taking: Reported on 9/20/2024), Disp: 30 tablet, Rfl: 0    ondansetron (ZOFRAN-ODT) 4 mg disintegrating tablet, Take 1 tablet (4 mg total) by mouth every 8 (eight) hours as needed for nausea or vomiting (Patient not taking: Reported on 9/20/2024), Disp: 15 tablet, Rfl: 0      Fransisco Colby PA-C    Scribe Attestation      I,:   am acting as a scribe while in the presence of the attending physician.:       I,:   personally performed the services described in this documentation    as scribed in my presence.:

## 2024-09-30 ENCOUNTER — OFFICE VISIT (OUTPATIENT)
Dept: PHYSICAL THERAPY | Facility: CLINIC | Age: 59
End: 2024-09-30
Payer: COMMERCIAL

## 2024-09-30 DIAGNOSIS — Z96.652 H/O TOTAL KNEE REPLACEMENT, LEFT: ICD-10-CM

## 2024-09-30 DIAGNOSIS — M17.12 PRIMARY OSTEOARTHRITIS OF LEFT KNEE: Primary | ICD-10-CM

## 2024-09-30 PROCEDURE — 97110 THERAPEUTIC EXERCISES: CPT | Performed by: PHYSICAL MEDICINE & REHABILITATION

## 2024-09-30 NOTE — PROGRESS NOTES
"Daily Note     Today's date: 2024  Patient name: Gurdeep Martin  : 1965  MRN: [id number]  Referring provider: Migue Martin, *  Dx:   Encounter Diagnosis     ICD-10-CM    1. Primary osteoarthritis of left knee  M17.12       2. H/O total knee replacement, left  Z96.652                      Subjective: Pt with no new sx/complaints. He notes he just has tightness in his thigh and calf mms this morning; more so with going up/down steps this morning. [place]; fatigue only. No knee pain, no knee buckling etc. Feels some strain through both knees walking downhill; no other complaints. Compliant with Hep.     Objective: See treatment diary below      Assessment: Tolerated treatment well. Pt with improvement noted in gait. Improved L stance time with reduced lateral flex with stance. No AD. Improved  L knee flex with swing. No evidence of imbalance or L knee instability. L knee ROM steadily improving; AAROM -4-132* this date without pain/sx L knee. Improving L quad strength and mm tone with PREs; cont with mm fatigue L quad with SLRs with min ext lag with mm fatigue; resolved at rest. Mm fatigue also noted L thigh with step ups and mini squats. No knee pain with/following tx. No complaints after session. HEP reviewed.  Patient demonstrated fatigue post treatment and would benefit from continued PT      Plan: Continue per plan of care.  Progress treatment as tolerated.       Precautions: HTN, L knee pain , s/p R TKA       Re-eval Date: 10/21    Date 2024     Visit Count 1 2 3     FOTO 2024       Pain In 2 0 0 \"tight\"     Pain Out 2 0 0/10         Manuals 2024     L knee pROM with gentle end range stretch as tolerated, L patellar mobs  Gentle end range stretch into TKE with roll under L ankle, L patellar mobility as carolyn 5'  Motion check    L knee flex 132*  Ext -4* supine       L HS, Calf, Hip flexor/quad stretch                        Neuro " "Re-Ed        Romberg      Semi tandem      Tandem         Perturbation training                                                 Ther Ex        SRC vs Nustep AAROM L Knee       L heel slides   L3 8'           10x/5-10\"    L4 10'           2x10/10\"  Upright bike L3   10'       2x10/10\"      L knee AAROM bottom step      Static L knee extension         Supine roll under ankle with QS   10x10\"  2nd step 10x10\" as carolyn      Knee ext stretch/HS stretch 2nd step  10x10-15\"  HEP        Knee ext stretch/HS stretch 2nd step  10x10-15\"          L QS      Calf stretch      HS stretch  Reviewed HEP     Supine 3x30\"       Supine 3x30\"  QS roll under ankle for TKE  10x10\"          QS roll under ankle for TKE  10x10\"         Supine with rope   4x30\" with focus on knee ext     L hip flexor/quad stretch    HRs EOT 3x30\"         Reviewed HEP  EOT 4x30\"         Pt performed in waiting room  EOT 4x30\"        Standing B HR  20-30x/5\"   Floor      SLRs standing-->mat table      HS curls      LAQ Standing 3 way L- reviewed HEP      Reviewed HEP           Standing 0#   3x10     0# 3x10 /5\" with QS  2# SLR flex and ABD mat table   2-3x10/5\" ea     Standing 2#   2-3x10/5\"    HEP     Mini squats      Step ups    1x10/5\" cues for form      Bottom step   1x10 L fwd        Calf stretch standing wedge  4x30\"  Calf stretch standing wedge  4x30\"          TKE standing with band     Ther Activity pt education regarding pathophysiology/pathoanatomy of present pain/sx and condition, role of PT in improving pain/sx and function, pt education regarding activity modification to avoid exacerbation of sx and delayed recovery;                       Gait Training Upcoming with RW--> no AD as able                        Modalities CP with elevation and knee ext after sessions - deferred to home today Pt deferred to home  Deferred to home                            9/9/2024  - HEP was reviewed this date for above noted exercises. Pt demonstrated understanding " without incident and without questions/concerns. Will continue to update upcoming.

## 2024-10-14 ENCOUNTER — OFFICE VISIT (OUTPATIENT)
Dept: PHYSICAL THERAPY | Facility: CLINIC | Age: 59
End: 2024-10-14
Payer: COMMERCIAL

## 2024-10-14 DIAGNOSIS — M17.12 PRIMARY OSTEOARTHRITIS OF LEFT KNEE: Primary | ICD-10-CM

## 2024-10-14 DIAGNOSIS — Z96.652 H/O TOTAL KNEE REPLACEMENT, LEFT: ICD-10-CM

## 2024-10-14 PROCEDURE — 97110 THERAPEUTIC EXERCISES: CPT | Performed by: PHYSICAL MEDICINE & REHABILITATION

## 2024-10-14 NOTE — PROGRESS NOTES
"Daily Note     Today's date: 10/14/2024  Patient name: Gurdeep Martin  : 1965  MRN: [id number]  Referring provider: Migue Martin, *  Dx:   Encounter Diagnosis     ICD-10-CM    1. Primary osteoarthritis of left knee  M17.12       2. H/O total knee replacement, left  Z96.652                      Subjective: Pt notes he went for a bike ride last week. Was feeling some tightness and pain lateral anterior knee afterwards. No new sx/complaints otherwise. C/c of tightness R/L anterior knee/distal quad however notes he feels this B; worse first thing in the morning/ when first getting up and bending the knees; better with movement but doesn't quite go away. Has been present since surgery/not new per pt.  No knee instability. RTD in ~2 weeks.       Objective: See treatment diary below      Assessment: Tolerated treatment well. Progressing well overall post op TKA. Maintaining good AROM/PROM L knee; L knee flexion WNL without pain /sx; lacking min end range TKE with stiffness only; reviewed ROM/stretches for HEP. Pt with discomfort anterior/lateral L knee with end range/prolonged L quad stretch; resolved at rest; pt notes similar sensation with bike riding or prone knee flex stretch home; advised pt to reduce frequency and intensity of these motions/stretches; understanding noted. Added TKE with band and lateral step ups for more quad strengthening; tolerated well without incident. No complaints after session. HEP reviewed. Cont with strength /endurance deficits L quad. Patient demonstrated fatigue post treatment and would benefit from continued PT      Plan: Continue per plan of care.  Progress treatment as tolerated.       Precautions: HTN, L knee pain , s/p R TKA       Re-eval Date: 10/21    Date 2024  9/18 9/30 10/14    Visit Count 1 2 3 4    FOTO 2024       Pain In 2 0 0 \"tight\" 1-2/ 10 \"stiffness\"     Pain Out 2 0 0/10 \"Same\"        Manuals 2024  9/18 9/30 10/14    L knee pROM with " "gentle end range stretch as tolerated, L patellar mobs  Gentle end range stretch into TKE with roll under L ankle, L patellar mobility as carolyn 5'  Motion check    L knee flex 132*  Ext -4* supine   Motion check    L HS, Calf, Hip flexor/quad stretch                        Neuro Re-Ed        Romberg      Semi tandem      Tandem         Perturbation training                                                 Ther Ex        SRC vs Nustep AAROM L Knee       L heel slides   L3 8'           10x/5-10\"    L4 10'           2x10/10\"  Upright bike L3   10'       2x10/10\"  Upright bike L3   10'       2x10/10\" slide board     L knee AAROM bottom step      Static L knee extension         Supine roll under ankle with QS   10x10\"  2nd step 10x10\" as carolyn      Knee ext stretch/HS stretch 2nd step  10x10-15\"  HEP        Knee ext stretch/HS stretch 2nd step  10x10-15\"              Knee ext stretch/HS stretch 2nd step  10x10-15\"     L QS      Calf stretch      HS stretch  Reviewed HEP     Supine 3x30\"       Supine 3x30\"  QS roll under ankle for TKE  10x10\"          QS roll under ankle for TKE  10x10\"         Supine with rope   4x30\" with focus on knee ext HEP      Prone hang knee ext PROM/stretch 0#  2'     HS stretch HEP     L hip flexor/quad stretch    HRs EOT 3x30\"         Reviewed HEP  EOT 4x30\"         Pt performed in waiting room  EOT 4x30\"        Standing B HR  20-30x/5\"   Floor  EOT 3x30\"        Standing B HR  30x/5\"   Floor     SLRs standing-->mat table      HS curls      LAQ Standing 3 way L- reviewed HEP      Reviewed HEP           Standing 0#   3x10     0# 3x10 /5\" with QS  2# SLR flex and ABD mat table   2-3x10/5\" ea     Standing 2#   2-3x10/5\"    HEP 2# SLR flex and ABD mat table   2-3x10/5\" ea    HEP      HEP     Mini squats      Step ups    1x10/5\" cues for form      Bottom step   1x10 L fwd  1x15/5\" cues for form      Bottom step   2-3x10 L fwd     1x15 lateral step up  1 HHA  Bottom step       Calf stretch standing " "wedge  4x30\"  Calf stretch standing wedge  4x30\"  Calf stretch standing wedge  4x30\"         TKE standing with band   Red  2x10/5-10\"     Ther Activity pt education regarding pathophysiology/pathoanatomy of present pain/sx and condition, role of PT in improving pain/sx and function, pt education regarding activity modification to avoid exacerbation of sx and delayed recovery;                       Gait Training Upcoming with RW--> no AD as able                        Modalities CP with elevation and knee ext after sessions - deferred to home today Pt deferred to home  Deferred to home  Pt deferred                           9/9/2024  - HEP was reviewed this date for above noted exercises. Pt demonstrated understanding without incident and without questions/concerns. Will continue to update upcoming.              "

## 2024-11-01 ENCOUNTER — OFFICE VISIT (OUTPATIENT)
Dept: OBGYN CLINIC | Facility: MEDICAL CENTER | Age: 59
End: 2024-11-01

## 2024-11-01 VITALS
HEIGHT: 68 IN | BODY MASS INDEX: 37.13 KG/M2 | DIASTOLIC BLOOD PRESSURE: 78 MMHG | HEART RATE: 58 BPM | SYSTOLIC BLOOD PRESSURE: 147 MMHG | WEIGHT: 245 LBS

## 2024-11-01 DIAGNOSIS — M17.0 PRIMARY OSTEOARTHRITIS OF BOTH KNEES: Primary | ICD-10-CM

## 2024-11-01 PROCEDURE — 99024 POSTOP FOLLOW-UP VISIT: CPT | Performed by: ORTHOPAEDIC SURGERY

## 2024-11-01 NOTE — PROGRESS NOTES
"Orthopaedic Surgery - Office Note  Gurdeep Martin (59 y.o. male)   : 1965   MRN: [id number]  Encounter Date: 2024    Assessment / Plan    Status post left total knee replacement on 2024  Status post right total knee replacement on 2024  Continue with HEP  Weightbearing as tolerated progress activity as tolerated.  We did remind patient about dental antibiotic prophylaxis going forward at this time.  Continue with ice and analgesics/anti-inflammatories as needed.  Follow-up:  Return in about 4 months (around 3/1/2025).      Chief Complaint / Date of Onset  Bilateral knee pain secondary to osteoarthritis chronic  Injury Mechanism / Date  None  Surgery / Date  Status post left total knee replacement on 2024  Status post right total knee replacement on 2024    History of Present Illness   Gurdeep Martin is a 59 y.o. male who presents for follow-up status post left total knee replacement on 2024.  Pt continues to do very well.  He feels that he is progressing much quicker than when he had his right knee replaced.  He has been very active over the past week and his knee is a little sore.  Overall he is very pleased with his progress so far.    His right knee overall is doing well also status post knee replacement on 2024.     Treatment Summary  Medications / Modalities  None  Bracing / Immobilization  None  Physical Therapy  Completed PO PT  Injections  10/18/24  7/17/23  9/23/23  10/15/19  Prior Surgeries  Right TKA  Other Treatments  None     Employment / Current Status  [employer]     Sport / Organization / Current Status  N/A      Review of Systems  Pertinent items are noted in HPI.  All other systems were reviewed and are negative.      Physical Exam  /78   Pulse 58   Ht 5' 8\" (1.727 m)   Wt 111 kg (245 lb)   BMI 37.25 kg/m²   Cons: Appears well.  No apparent distress.  Psych: Alert. Oriented x3.  Mood and affect normal.  Eyes: PERRLA, EOMI  Resp: Normal effort. "  No audible wheezing or stridor.  CV: Palpable pulse.  No discernable arrhythmia.  No LE edema.  Lymph:  No palpable cervical, axillary, or inguinal lymphadenopathy.  Skin: Warm.  No palpable masses.  No visible lesions.  Neuro: Normal muscle tone.  Normal and symmetric DTR's.     Left Knee Exam  Alignment:  Normal knee alignment.  Inspection:   Incision healed, no erythema  Palpation:  No tenderness. No effusion.  ROM:  Knee Extension 2 degrees. Knee Flexion 120.  Strength:  5/5 quadriceps and hamstrings.  Stability:  (-) Varus instability. (-) Valgus instability.  Tests:  No pertinent positive or negative tests.  Patella:  Patella tracks centrally without crepitus.  Neurovascular:  Sensation intact in DP/SP/Wong/Sa/T nerve distributions. Sensation intact in all digital nerve distributions.  Toes warm and perfused.  Gait:  Normal.       Studies Reviewed  I have personally reviewed pertinent films in PACS.  XR of right knee - from 9/20/2024 shows prosthesis in good position with no signs of loosening.  No fracture or dislocation.      Procedures  No procedures today.    Medical, Surgical, Family, and Social History  The patient's medical history, family history, and social history, were reviewed and updated as appropriate.    Past Medical History:   Diagnosis Date    Arthritis     Gout     Hyperlipidemia     Hypertension        Past Surgical History:   Procedure Laterality Date    CARPAL TUNNEL RELEASE Bilateral     COLONOSCOPY      NEUROMA EXCISION Left     Foot    UT ARTHRP KNE CONDYLE&PLATU MEDIAL&LAT COMPARTMENTS Right 2/22/2024    Procedure: ARTHROPLASTY KNEE TOTAL, same day d/c;  Surgeon: Migue Martin MD;  Location: [facility];  Service: Orthopedics    UT ARTHRP KNE CONDYLE&PLATU MEDIAL&LAT COMPARTMENTS Left 9/5/2024    Procedure: ARTHROPLASTY KNEE TOTAL, same day dc;  Surgeon: Migue Martin MD;  Location: [facility];  Service: Orthopedics       History reviewed. No pertinent family  history.    Social History     Occupational History    Not on file   Tobacco Use    Smoking status: Never    Smokeless tobacco: Never   Vaping Use    Vaping status: Never Used   Substance and Sexual Activity    Alcohol use: Yes     Alcohol/week: 3.0 standard drinks of alcohol     Types: 3 Standard drinks or equivalent per week     Comment: drinks liqor on weekends, last drank 9/1    Drug use: Never    Sexual activity: Yes       No Known Allergies      Current Outpatient Medications:     atorvastatin (LIPITOR) 20 mg tablet, Take 20 mg by mouth daily, Disp: , Rfl:     lisinopril-hydrochlorothiazide (PRINZIDE,ZESTORETIC) 20-12.5 MG per tablet, Take 1 tablet by mouth daily, Disp: , Rfl:     metoprolol succinate (TOPROL-XL) 50 mg 24 hr tablet, Take 50 mg by mouth daily, Disp: , Rfl:     Multiple Vitamin (multivitamin) tablet, Take 1 tablet by mouth daily, Disp: 30 tablet, Rfl: 1    acetaminophen (TYLENOL) 650 mg CR tablet, Take 1 tablet (650 mg total) by mouth every 6 (six) hours as needed for mild pain (Patient not taking: Reported on 11/1/2024), Disp: 90 tablet, Rfl: 0    ascorbic Acid (VITAMIN C) 500 MG CPCR, Take 1 capsule (500 mg total) by mouth daily (Patient not taking: Reported on 9/20/2024), Disp: 30 capsule, Rfl: 0    aspirin (ECOTRIN LOW STRENGTH) 81 mg EC tablet, Take 1 tablet (81 mg total) by mouth 2 (two) times a day, Disp: 60 tablet, Rfl: 0    Cholecalciferol 25 MCG (1000 UT) tablet, Take 1,000 Units by mouth daily (Patient not taking: Reported on 9/20/2024), Disp: , Rfl:     ferrous sulfate 324 (65 Fe) mg, Take 1 tablet (324 mg total) by mouth 2 (two) times a day before meals (Patient not taking: Reported on 9/20/2024), Disp: 60 tablet, Rfl: 0    folic acid (FOLVITE) 1 mg tablet, Take 1 tablet (1 mg total) by mouth daily (Patient not taking: Reported on 9/20/2024), Disp: 30 tablet, Rfl: 0    naproxen (NAPROSYN) 500 mg tablet, Take 1 tablet (500 mg total) by mouth 2 (two) times a day with meals (Patient  not taking: Reported on 11/1/2024), Disp: 60 tablet, Rfl: 0    ondansetron (ZOFRAN-ODT) 4 mg disintegrating tablet, Take 1 tablet (4 mg total) by mouth every 8 (eight) hours as needed for nausea or vomiting (Patient not taking: Reported on 9/20/2024), Disp: 15 tablet, Rfl: 0      Migue Martin MD    Scribe Attestation      I,:   am acting as a scribe while in the presence of the attending physician.:       I,:   personally performed the services described in this documentation    as scribed in my presence.:

## 2025-02-03 ENCOUNTER — TELEPHONE (OUTPATIENT)
Age: 60
End: 2025-02-03

## 2025-02-03 DIAGNOSIS — Z96.652 STATUS POST TOTAL KNEE REPLACEMENT, LEFT: Primary | ICD-10-CM

## 2025-02-03 RX ORDER — AMOXICILLIN 500 MG/1
2000 CAPSULE ORAL
Qty: 12 CAPSULE | Refills: 2 | Status: SHIPPED | OUTPATIENT
Start: 2025-02-03 | End: 2025-02-04

## 2025-02-03 NOTE — TELEPHONE ENCOUNTER
Caller: Patient     Doctor: Dr. Martin     Reason for call: Asked for pre-medication for dentist appointment     Call back#: [phone]   Please send to [facility]

## 2025-02-28 ENCOUNTER — OFFICE VISIT (OUTPATIENT)
Dept: OBGYN CLINIC | Facility: MEDICAL CENTER | Age: 60
End: 2025-02-28
Payer: COMMERCIAL

## 2025-02-28 VITALS — HEIGHT: 68 IN | BODY MASS INDEX: 37.13 KG/M2 | WEIGHT: 245 LBS

## 2025-02-28 DIAGNOSIS — M17.0 PRIMARY OSTEOARTHRITIS OF BOTH KNEES: Primary | ICD-10-CM

## 2025-02-28 PROCEDURE — 99213 OFFICE O/P EST LOW 20 MIN: CPT | Performed by: ORTHOPAEDIC SURGERY

## 2025-02-28 NOTE — PROGRESS NOTES
"Orthopaedic Surgery - Office Note  Gurdeep Martin (60 y.o. male)   : 1965   MRN: [id number]  Encounter Date: 2025    Assessment / Plan    Status post left total knee replacement on 2024  Status post right total knee replacement on 2024  Continue with HEP  Recommends performing soft tissue mobilization of the right quadriceps   Weightbearing as tolerated progress activity as tolerated.  We did remind patient about dental antibiotic prophylaxis going forward at this time.  Continue with ice and analgesics/anti-inflammatories as needed.  Follow-up:  Return in about 7 months (around 2025).      Chief Complaint / Date of Onset  Bilateral knee pain secondary to osteoarthritis chronic  Injury Mechanism / Date  None  Surgery / Date  Status post left total knee replacement on 2024  Status post right total knee replacement on 2024    History of Present Illness   Gurdeep Martin is a 60 y.o. male who presents for follow-up status post left total knee replacement on 2024 and his right knee 24. He claims that his right knee has been bothersome since the holidays. He was going up a step ladder when he started to experience pain on the lateral aspect. Pain has not subsided since the that instance. He describes increased stiffness in the morning for the first few steps. His left knee is doing great with no current complaints.      Treatment Summary  Medications / Modalities  None  Bracing / Immobilization  None  Physical Therapy  Completed PO PT  Injections  10/18/24  7/17/23  9/23/23  10/15/19  Prior Surgeries  Right TKA  Other Treatments  None     Employment / Current Status  [employer]     Sport / Organization / Current Status  N/A      Review of Systems  Pertinent items are noted in HPI.  All other systems were reviewed and are negative.      Physical Exam  Ht 5' 8\" (1.727 m)   Wt 111 kg (245 lb)   BMI 37.25 kg/m²   Cons: Appears well.  No apparent distress.  Psych: Alert. " Oriented x3.  Mood and affect normal.  Eyes: PERRLA, EOMI  Resp: Normal effort.  No audible wheezing or stridor.  CV: Palpable pulse.  No discernable arrhythmia.  No LE edema.  Lymph:  No palpable cervical, axillary, or inguinal lymphadenopathy.  Skin: Warm.  No palpable masses.  No visible lesions.  Neuro: Normal muscle tone.  Normal and symmetric DTR's.     Left Knee Exam  Alignment:  Normal knee alignment.  Inspection:   Incision healed, no erythema  Palpation:  No tenderness. No effusion.  ROM:  Knee Extension 2 degrees. Knee Flexion 120.  Strength:  5/5 quadriceps and hamstrings.  Stability:  (-) Varus instability. (-) Valgus instability.  Tests:  No pertinent positive or negative tests.  Patella:  Patella tracks centrally without crepitus.  Neurovascular:  Sensation intact in DP/SP/Wong/Sa/T nerve distributions. Sensation intact in all digital nerve distributions.  Toes warm and perfused.  Gait:  Normal.       Studies Reviewed  I have personally reviewed pertinent films in PACS.  XR of right knee - from 9/20/2024 shows prosthesis in good position with no signs of loosening.  No fracture or dislocation.      Procedures  No procedures today.    Medical, Surgical, Family, and Social History  The patient's medical history, family history, and social history, were reviewed and updated as appropriate.    Past Medical History:   Diagnosis Date    Arthritis     Gout     Hyperlipidemia     Hypertension        Past Surgical History:   Procedure Laterality Date    CARPAL TUNNEL RELEASE Bilateral     COLONOSCOPY      NEUROMA EXCISION Left     Foot    SC ARTHRP KNE CONDYLE&PLATU MEDIAL&LAT COMPARTMENTS Right 2/22/2024    Procedure: ARTHROPLASTY KNEE TOTAL, same day d/c;  Surgeon: Migue Martin MD;  Location: [facility] OR;  Service: Orthopedics    SC ARTHRP KNE CONDYLE&PLATU MEDIAL&LAT COMPARTMENTS Left 9/5/2024    Procedure: ARTHROPLASTY KNEE TOTAL, same day dc;  Surgeon: Migue Martin MD;  Location: [facility]  OR;  Service: Orthopedics       History reviewed. No pertinent family history.    Social History     Occupational History    Not on file   Tobacco Use    Smoking status: Never    Smokeless tobacco: Never   Vaping Use    Vaping status: Never Used   Substance and Sexual Activity    Alcohol use: Yes     Alcohol/week: 3.0 standard drinks of alcohol     Types: 3 Standard drinks or equivalent per week     Comment: drinks liqor on weekends, last drank 9/1    Drug use: Never    Sexual activity: Yes       No Known Allergies      Current Outpatient Medications:     atorvastatin (LIPITOR) 20 mg tablet, Take 20 mg by mouth daily, Disp: , Rfl:     metoprolol succinate (TOPROL-XL) 50 mg 24 hr tablet, Take 50 mg by mouth daily, Disp: , Rfl:     Multiple Vitamin (multivitamin) tablet, Take 1 tablet by mouth daily, Disp: 30 tablet, Rfl: 1    acetaminophen (TYLENOL) 650 mg CR tablet, Take 1 tablet (650 mg total) by mouth every 6 (six) hours as needed for mild pain (Patient not taking: Reported on 2/28/2025), Disp: 90 tablet, Rfl: 0    ascorbic Acid (VITAMIN C) 500 MG CPCR, Take 1 capsule (500 mg total) by mouth daily (Patient not taking: Reported on 9/20/2024), Disp: 30 capsule, Rfl: 0    aspirin (ECOTRIN LOW STRENGTH) 81 mg EC tablet, Take 1 tablet (81 mg total) by mouth 2 (two) times a day, Disp: 60 tablet, Rfl: 0    Cholecalciferol 25 MCG (1000 UT) tablet, Take 1,000 Units by mouth daily (Patient not taking: Reported on 2/28/2025), Disp: , Rfl:     ferrous sulfate 324 (65 Fe) mg, Take 1 tablet (324 mg total) by mouth 2 (two) times a day before meals (Patient not taking: Reported on 9/20/2024), Disp: 60 tablet, Rfl: 0    folic acid (FOLVITE) 1 mg tablet, Take 1 tablet (1 mg total) by mouth daily (Patient not taking: Reported on 9/20/2024), Disp: 30 tablet, Rfl: 0    lisinopril-hydrochlorothiazide (PRINZIDE,ZESTORETIC) 20-12.5 MG per tablet, Take 1 tablet by mouth daily, Disp: , Rfl:     naproxen (NAPROSYN) 500 mg tablet, Take 1  tablet (500 mg total) by mouth 2 (two) times a day with meals (Patient not taking: Reported on 2/28/2025), Disp: 60 tablet, Rfl: 0    ondansetron (ZOFRAN-ODT) 4 mg disintegrating tablet, Take 1 tablet (4 mg total) by mouth every 8 (eight) hours as needed for nausea or vomiting (Patient not taking: Reported on 2/28/2025), Disp: 15 tablet, Rfl: 0      Mike Hummel    Scribe Attestation      I,:  Mike Hummel am acting as a scribe while in the presence of the attending physician.:       I,:  Migue Martin MD personally performed the services described in this documentation    as scribed in my presence.:

## (undated) DEVICE — GLOVE INDICATOR PI UNDERGLOVE SZ 8.5 BLUE

## (undated) DEVICE — SURGICAL GOWN, XL SMARTSLEEVE: Brand: CONVERTORS

## (undated) DEVICE — SUT VICRYL 0 CT-1 36 IN J946H

## (undated) DEVICE — CAPIT KNEE ATTUNE FB AOX POR

## (undated) DEVICE — 10FR FRAZIER SUCTION HANDLE: Brand: CARDINAL HEALTH

## (undated) DEVICE — SCD SEQUENTIAL COMPRESSION COMFORT SLEEVE MEDIUM KNEE LENGTH: Brand: KENDALL SCD

## (undated) DEVICE — IMPERVIOUS STOCKINETTE: Brand: DEROYAL

## (undated) DEVICE — WEBRIL 6 IN UNSTERILE

## (undated) DEVICE — DRESSING MEPILEX AG BORDER POST-OP 4 X 12 IN

## (undated) DEVICE — GLOVE SRG BIOGEL 6.5

## (undated) DEVICE — GLOVE SRG BIOGEL 8

## (undated) DEVICE — COBAN 6 IN STERILE

## (undated) DEVICE — BLADE SAW RECIP 12.5 X 76MM 0.9/0.9MM THCK

## (undated) DEVICE — NEEDLE 18 G X 1 1/2

## (undated) DEVICE — SUT STRATAFIX SPIRAL PDS PLUS 1 CTX 18 IN SXPP1A400

## (undated) DEVICE — SAW BLADE OSCILLATING BRAZOL 167

## (undated) DEVICE — 3M™ IOBAN™ 2 ANTIMICROBIAL INCISE DRAPE 6648EZ: Brand: IOBAN™ 2

## (undated) DEVICE — HANDPIECE SET WITH RETRACTABLE COAXIAL FAN SPRAY TIP AND SUCTION TUBE: Brand: INTERPULSE

## (undated) DEVICE — SPONGE LAP 18 X 18 IN STRL RFD

## (undated) DEVICE — GLOVE INDICATOR PI UNDERGLOVE SZ 7 BLUE

## (undated) DEVICE — 4-PORT MANIFOLD: Brand: NEPTUNE 2

## (undated) DEVICE — SYRINGE 50ML LL

## (undated) DEVICE — BETHLEHEM UNIV TOTAL KNEE, KIT: Brand: CARDINAL HEALTH

## (undated) DEVICE — COOL TEMP PAD

## (undated) DEVICE — 3M™ STERI-DRAPE™ U-DRAPE 1015: Brand: STERI-DRAPE™

## (undated) DEVICE — TIBURON SPLIT SHEET: Brand: CONVERTORS

## (undated) DEVICE — PLUMEPEN PRO 10FT

## (undated) DEVICE — SUT VICRYL 1 CTX 36 IN J977H

## (undated) DEVICE — EXOFIN PRECISION PEN HIGH VISCOSITY TOPICAL SKIN ADHESIVE: Brand: EXOFIN PRECISION PEN, 1G

## (undated) DEVICE — ACE WRAP 6 IN UNSTERILE

## (undated) DEVICE — HOOD: Brand: FLYTE, SURGICOOL

## (undated) DEVICE — I DISPOSABLE MIXING BOWL AND SPATULA: Brand: HOWMEDICA, MIX-KIT

## (undated) DEVICE — GLOVE SRG BIOGEL 7.5

## (undated) DEVICE — ADHESIVE SKIN HIGH VISCOSITY EXOFIN 1ML

## (undated) DEVICE — SUT STRATAFIX SPIRAL MONOCRYL PLUS 2-0 CT-1 30CM SXMP1B412

## (undated) DEVICE — SAW BLADE RECIPROCATING 179